# Patient Record
Sex: FEMALE | Race: WHITE | NOT HISPANIC OR LATINO | Employment: FULL TIME | URBAN - METROPOLITAN AREA
[De-identification: names, ages, dates, MRNs, and addresses within clinical notes are randomized per-mention and may not be internally consistent; named-entity substitution may affect disease eponyms.]

---

## 2014-04-28 LAB — HCV AB SER-ACNC: NEGATIVE

## 2020-08-19 ENCOUNTER — TELEPHONE (OUTPATIENT)
Dept: ADMINISTRATIVE | Facility: OTHER | Age: 60
End: 2020-08-19

## 2020-08-19 NOTE — TELEPHONE ENCOUNTER
----- Message from Joylene Rubinstein, [place] sent at 8/19/2020  9:42 AM EDT -----  08/19/20 9:43 AM    Hello, our patient Jose Obrien has had Mammogram completed/performed  Please assist in updating the patient chart by pulling the Care Everywhere (CE) document  The date of service is 07/2020       Thank you,  Joylene Rubinstein, MA   [facility]

## 2020-08-19 NOTE — TELEPHONE ENCOUNTER
Upon review of the In Basket request we were able to locate, review, and update the patient chart as requested for Mammogram     Any additional questions or concerns should be emailed to the Practice Liaisons via [email or web address] email, please do not reply via In Basket      Thank you  Cordell Forde MA

## 2020-08-20 ENCOUNTER — TELEPHONE (OUTPATIENT)
Dept: ADMINISTRATIVE | Facility: OTHER | Age: 60
End: 2020-08-20

## 2020-08-20 NOTE — TELEPHONE ENCOUNTER
Upon review of the In Basket request we were able to locate, review, and update the patient chart as requested for Hepatitis C   Any additional questions or concerns should be emailed to the Practice Liaisons via [email or web address] email, please do not reply via In Basket      Thank you  Harvey [place] MA

## 2020-08-20 NOTE — TELEPHONE ENCOUNTER
----- Message from Lena Durant sent at 8/20/2020  1:15 PM EDT -----  Regarding: HIV  08/20/20 1:16 PM    Hello, our patient Maria Teresa Bills has had HIV completed/performed  Please assist in updating the patient chart by pulling the Care Everywhere (CE) document  The date of service is 4/28/2014       Thank you,  John Kelley MA   [facility]

## 2020-08-21 ENCOUNTER — OFFICE VISIT (OUTPATIENT)
Dept: FAMILY MEDICINE CLINIC | Facility: CLINIC | Age: 60
End: 2020-08-21
Payer: COMMERCIAL

## 2020-08-21 VITALS
BODY MASS INDEX: 26.89 KG/M2 | OXYGEN SATURATION: 96 % | SYSTOLIC BLOOD PRESSURE: 102 MMHG | TEMPERATURE: 97.9 F | DIASTOLIC BLOOD PRESSURE: 70 MMHG | HEIGHT: 65 IN | WEIGHT: 161.4 LBS | HEART RATE: 74 BPM

## 2020-08-21 DIAGNOSIS — F32.5 MAJOR DEPRESSION IN COMPLETE REMISSION (HCC): ICD-10-CM

## 2020-08-21 DIAGNOSIS — G43.909 MIGRAINE WITHOUT STATUS MIGRAINOSUS, NOT INTRACTABLE, UNSPECIFIED MIGRAINE TYPE: ICD-10-CM

## 2020-08-21 DIAGNOSIS — F43.9 SITUATIONAL STRESS: Primary | ICD-10-CM

## 2020-08-21 DIAGNOSIS — B00.9 HERPES SIMPLEX: ICD-10-CM

## 2020-08-21 DIAGNOSIS — I10 ESSENTIAL HYPERTENSION: ICD-10-CM

## 2020-08-21 DIAGNOSIS — E78.2 MIXED HYPERLIPIDEMIA: ICD-10-CM

## 2020-08-21 PROCEDURE — 3008F BODY MASS INDEX DOCD: CPT | Performed by: FAMILY MEDICINE

## 2020-08-21 PROCEDURE — 3078F DIAST BP <80 MM HG: CPT | Performed by: FAMILY MEDICINE

## 2020-08-21 PROCEDURE — 1036F TOBACCO NON-USER: CPT | Performed by: FAMILY MEDICINE

## 2020-08-21 PROCEDURE — 3074F SYST BP LT 130 MM HG: CPT | Performed by: FAMILY MEDICINE

## 2020-08-21 PROCEDURE — 99204 OFFICE O/P NEW MOD 45 MIN: CPT | Performed by: FAMILY MEDICINE

## 2020-08-21 PROCEDURE — 3725F SCREEN DEPRESSION PERFORMED: CPT | Performed by: FAMILY MEDICINE

## 2020-08-21 RX ORDER — TRAZODONE HYDROCHLORIDE 50 MG/1
TABLET ORAL
Qty: 60 TABLET | Refills: 5 | Status: SHIPPED | OUTPATIENT
Start: 2020-08-21

## 2020-08-21 RX ORDER — BUPROPION HYDROCHLORIDE 150 MG/1
150 TABLET ORAL DAILY
COMMUNITY
Start: 2020-06-04 | End: 2021-07-28 | Stop reason: SDUPTHER

## 2020-08-21 RX ORDER — ELETRIPTAN HYDROBROMIDE 40 MG/1
40 TABLET, FILM COATED ORAL ONCE AS NEEDED
Qty: 6 TABLET | Refills: 0
Start: 2020-08-21 | End: 2021-07-28 | Stop reason: SDUPTHER

## 2020-08-21 RX ORDER — FLUOXETINE HYDROCHLORIDE 20 MG/1
20 CAPSULE ORAL DAILY
COMMUNITY
Start: 2020-07-05 | End: 2020-11-23 | Stop reason: SDUPTHER

## 2020-08-21 RX ORDER — VALACYCLOVIR HYDROCHLORIDE 1 G/1
1000 TABLET, FILM COATED ORAL 2 TIMES DAILY
Qty: 2 TABLET | Refills: 0
Start: 2020-08-21 | End: 2020-10-20 | Stop reason: SDUPTHER

## 2020-08-21 RX ORDER — ATORVASTATIN CALCIUM 10 MG/1
10 TABLET, FILM COATED ORAL DAILY
COMMUNITY
Start: 2020-06-04 | End: 2021-07-28 | Stop reason: SDUPTHER

## 2020-08-21 RX ORDER — ATENOLOL 50 MG/1
50 TABLET ORAL DAILY
COMMUNITY
Start: 2020-06-04 | End: 2021-07-28 | Stop reason: SDUPTHER

## 2020-08-21 RX ORDER — OMEPRAZOLE 40 MG/1
40 CAPSULE, DELAYED RELEASE ORAL DAILY
COMMUNITY
Start: 2020-06-04 | End: 2021-07-28 | Stop reason: SDUPTHER

## 2020-08-21 RX ORDER — TOPIRAMATE 50 MG/1
50 TABLET, FILM COATED ORAL DAILY
COMMUNITY
Start: 2020-06-04 | End: 2021-07-28 | Stop reason: SDUPTHER

## 2020-08-21 NOTE — PATIENT INSTRUCTIONS
Symptoms appear to be related to stress  They certainly are not related to exertion and she has a great deal of stress happening right now  Some discussion about her position and the Covid pandemic  Continue same medications for now with the exception of the addition of trazodone 50 mg, 1 or 2 tablets at bedtime to help with sleep  Suggest looking up relaxation techniques on her computer and practice them  Recheck in 3 weeks
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
## 2020-08-24 ENCOUNTER — TELEPHONE (OUTPATIENT)
Dept: ADMINISTRATIVE | Facility: OTHER | Age: 60
End: 2020-08-24

## 2020-08-24 NOTE — LETTER
Procedure Request Form: Colonoscopy      Date Requested: 20  Patient: Dulce Lester  Patient : 3//1960   Referring Provider: Daved Stalls, MD        Date of Procedure ______________________________       The above patient has informed us that they have completed their   most recent Colonoscopy at your facility  Please complete   this form and attach all corresponding procedure reports/results  Comments __________________________________________________________  ____________________________________________________________________  ____________________________________________________________________  ____________________________________________________________________    Facility Completing Procedure _________________________________________    Form Completed By (print name) _______________________________________      Signature __________________________________________________________      These reports are needed for  compliance    Please fax this completed form and a copy of the procedure report to our office located at [place] [address] as soon as possible to [phone] sebas Oneil: Phone [phone]    We thank you for your assistance in treating our mutual patient

## 2020-08-24 NOTE — TELEPHONE ENCOUNTER
----- Message from Neena Hinds MD sent at 8/21/2020  4:04 PM EDT -----  Please obtain colonoscopy results from [facility]

## 2020-08-24 NOTE — TELEPHONE ENCOUNTER
Upon review of the In Basket request and the patient's chart, initial outreach has been made via fax, please see Contacts section for details  A second outreach attempt will be made within 5 business days      Thank you  Min Dawson MA

## 2020-08-31 NOTE — TELEPHONE ENCOUNTER
Upon review of your request/inquiry, we have found as a result of outreach that patient did not have the requested item(s) completed  Any additional questions or concerns should be emailed to the Practice Liaisons via [email or web address] email, please do not reply via In Basket       Thank you  Ibeth Howard MA

## 2020-09-14 ENCOUNTER — OFFICE VISIT (OUTPATIENT)
Dept: FAMILY MEDICINE CLINIC | Facility: CLINIC | Age: 60
End: 2020-09-14
Payer: COMMERCIAL

## 2020-09-14 VITALS
TEMPERATURE: 98.2 F | HEIGHT: 65 IN | SYSTOLIC BLOOD PRESSURE: 100 MMHG | HEART RATE: 78 BPM | RESPIRATION RATE: 18 BRPM | WEIGHT: 163 LBS | BODY MASS INDEX: 27.16 KG/M2 | DIASTOLIC BLOOD PRESSURE: 68 MMHG

## 2020-09-14 DIAGNOSIS — L84 CORN OF FOOT: ICD-10-CM

## 2020-09-14 DIAGNOSIS — F43.9 SITUATIONAL STRESS: Primary | ICD-10-CM

## 2020-09-14 DIAGNOSIS — Z12.11 SCREENING FOR COLON CANCER: ICD-10-CM

## 2020-09-14 PROCEDURE — 99214 OFFICE O/P EST MOD 30 MIN: CPT | Performed by: FAMILY MEDICINE
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
## 2020-09-14 NOTE — PATIENT INSTRUCTIONS
Having difficulty dealing with the stress  Suggest taking a different attitude toward approaching what's going on at work  She is asked not to work for the next 2 weeks  Continue present medications  Also recommend that she see a documented everything that happens related to her job  As far as her right 4th toe, suggested Dr Jocelyn diaz  Finally, Riya for colon cancer screening  Recheck in 2 weeks

## 2020-09-14 NOTE — LETTER
September 14, 2020     Patient: Dulce Lester   YOB: 1960   Date of Visit: 9/14/2020       To Whom it May Concern: Gonzalo Acevedo is under my professional care  She was seen in my office on 9/14/2020  She is presently unable to work  Next evaluation in 2 weeks  If you have any questions or concerns, please don't hesitate to call           Sincerely,          Joseline Sweet MD        CC: No Recipients

## 2020-09-29 ENCOUNTER — LAB (OUTPATIENT)
Dept: LAB | Facility: MEDICAL CENTER | Age: 60
End: 2020-09-29
Payer: COMMERCIAL

## 2020-09-29 ENCOUNTER — OFFICE VISIT (OUTPATIENT)
Dept: FAMILY MEDICINE CLINIC | Facility: CLINIC | Age: 60
End: 2020-09-29
Payer: COMMERCIAL

## 2020-09-29 VITALS
OXYGEN SATURATION: 96 % | BODY MASS INDEX: 27.31 KG/M2 | WEIGHT: 163.9 LBS | DIASTOLIC BLOOD PRESSURE: 60 MMHG | SYSTOLIC BLOOD PRESSURE: 100 MMHG | HEIGHT: 65 IN | TEMPERATURE: 98.1 F | HEART RATE: 80 BPM

## 2020-09-29 DIAGNOSIS — Z11.4 SCREENING FOR HIV (HUMAN IMMUNODEFICIENCY VIRUS): ICD-10-CM

## 2020-09-29 DIAGNOSIS — E78.2 MIXED HYPERLIPIDEMIA: ICD-10-CM

## 2020-09-29 DIAGNOSIS — F43.9 SITUATIONAL STRESS: Primary | ICD-10-CM

## 2020-09-29 DIAGNOSIS — Z23 NEEDS FLU SHOT: ICD-10-CM

## 2020-09-29 DIAGNOSIS — I10 ESSENTIAL HYPERTENSION: ICD-10-CM

## 2020-09-29 DIAGNOSIS — Z23 NEED FOR INFLUENZA VACCINATION: ICD-10-CM

## 2020-09-29 DIAGNOSIS — F32.5 MAJOR DEPRESSION IN COMPLETE REMISSION (HCC): ICD-10-CM

## 2020-09-29 LAB
ANION GAP SERPL CALCULATED.3IONS-SCNC: 5 MMOL/L (ref 4–13)
BUN SERPL-MCNC: 24 MG/DL (ref 5–25)
CALCIUM SERPL-MCNC: 9.7 MG/DL (ref 8.3–10.1)
CHLORIDE SERPL-SCNC: 112 MMOL/L (ref 100–108)
CHOLEST SERPL-MCNC: 165 MG/DL (ref 50–200)
CO2 SERPL-SCNC: 25 MMOL/L (ref 21–32)
CREAT SERPL-MCNC: 0.93 MG/DL (ref 0.6–1.3)
GFR SERPL CREATININE-BSD FRML MDRD: 68 ML/MIN/1.73SQ M
GLUCOSE P FAST SERPL-MCNC: 93 MG/DL (ref 65–99)
HDLC SERPL-MCNC: 36 MG/DL
LDLC SERPL CALC-MCNC: 96 MG/DL (ref 0–100)
NONHDLC SERPL-MCNC: 129 MG/DL
POTASSIUM SERPL-SCNC: 4.6 MMOL/L (ref 3.5–5.3)
SODIUM SERPL-SCNC: 142 MMOL/L (ref 136–145)
TRIGL SERPL-MCNC: 165 MG/DL

## 2020-09-29 PROCEDURE — 90682 RIV4 VACC RECOMBINANT DNA IM: CPT

## 2020-09-29 PROCEDURE — 90471 IMMUNIZATION ADMIN: CPT

## 2020-09-29 PROCEDURE — 36415 COLL VENOUS BLD VENIPUNCTURE: CPT

## 2020-09-29 PROCEDURE — 80061 LIPID PANEL: CPT

## 2020-09-29 PROCEDURE — 87389 HIV-1 AG W/HIV-1&-2 AB AG IA: CPT

## 2020-09-29 PROCEDURE — 80048 BASIC METABOLIC PNL TOTAL CA: CPT

## 2020-09-29 PROCEDURE — 99214 OFFICE O/P EST MOD 30 MIN: CPT | Performed by: FAMILY MEDICINE

## 2020-09-29 NOTE — PROGRESS NOTES
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 that she is out and cannot communicate  Made her angrier  Continues on Prozac and Wellbutrin  Trazodone helps her sleep at night  Having difficulty the with the thought of continuing to work for people who lack more orals and want to do things dishonestly  Possibly has a different job opportunity that would be more for filling and less stressful  Past Medical History:   Diagnosis Date    Anxiety state 1/9/2015    Esophageal reflux 4/28/2014    Essential hypertension 2/26/2016    Herpes simplex 6/12/2012    [facility] in [facility]    Migraine 3/20/2015    Mixed hyperlipidemia 6/11/2010    Obstructive sleep apnea syndrome 2/26/2016    Palpitations 3/20/2015    Sensorineural hearing loss 10/30/2014    Trigeminal neuralgia of left side of face 8/25/2016    Vitamin D deficiency 4/29/2014        Past Surgical History:   Procedure Laterality Date    APPENDECTOMY      EXOSTECTOMY Right 2004    Fifth toe    LAPAROSCOPIC CHOLECYSTECTOMY  2006    LAPAROSCOPIC HYSTERECTOMY  2007    TONSILLECTOMY         Social History     Tobacco Use    Smoking status: Never Smoker    Smokeless tobacco: Never Used   Substance Use Topics    Alcohol use: Yes     Frequency: Monthly or less       Social History     Social History Narrative    Cat Magan since 8/13   since 10/7/17  He has 3 grown children  and 5 grandchildren  Rickieneelam Correia is a  for [employer] in [employer], a [employer]  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  First  committed suicide after they were   2 sons   1 grandson born 6/21/20      Works as  for [employer]  Previously at  [facility] since 5/28/15  After 8 years at [facility] Caballero owns [employer] in [facility]  The following portions of the patient's history were reviewed and updated as appropriate: allergies, current medications, past family history, past medical history, past social history, past surgical history and problem list       Review of Systems   Constitutional: Negative for activity change and appetite change  HENT: Negative for ear pain and hearing loss  Eyes: Negative for visual disturbance  Respiratory: Negative for shortness of breath and wheezing  Cardiovascular: Negative for chest pain and leg swelling  Gastrointestinal: Negative for abdominal pain, constipation and diarrhea  Genitourinary: Negative for difficulty urinating  Musculoskeletal: Negative for arthralgias and back pain  Skin: Negative for rash  Neurological: Negative for headaches  Psychiatric/Behavioral: Negative for dysphoric mood  The patient is nervous/anxious  /60 (BP Location: Left arm, Patient Position: Sitting, Cuff Size: Adult)   Pulse 80   Temp 98 1 °F (36 7 °C) (Tympanic)   Ht 5' 5" (1 651 m)   Wt 74 3 kg (163 lb 14 4 oz)   SpO2 96%   BMI 27 27 kg/m²      Physical Exam   Interacts appropriately  Good judgement  Tenderness over her left TMJ  BMI Counseling: Body mass index is 27 27 kg/m²  The BMI is above normal  Nutrition recommendations include encouraging healthy choices of fruits and vegetables, consuming healthier snacks and limiting drinks that contain sugar  Exercise recommendations include moderate physical activity 150 minutes/week                   Current Outpatient Medications:     atenolol (TENORMIN) 50 mg tablet, Take 50 mg by mouth daily, Disp: , Rfl:     atorvastatin (LIPITOR) 10 mg tablet, Take 10 mg by mouth daily, Disp: , Rfl:     buPROPion (WELLBUTRIN XL) 150 mg 24 hr tablet, Take 150 mg by mouth daily, Disp: , Rfl:    eletriptan (RELPAX) 40 MG tablet, Take 1 tablet (40 mg total) by mouth once as needed for migraine for up to 1 dose may repeat in 2 hours if necessary, Disp: 6 tablet, Rfl: 0    FLUoxetine (PROzac) 20 mg capsule, Take 20 mg by mouth daily, Disp: , Rfl:     omeprazole (PriLOSEC) 40 MG capsule, Take 40 mg by mouth daily, Disp: , Rfl:     topiramate (TOPAMAX) 50 MG tablet, Take 50 mg by mouth daily, Disp: , Rfl:     traZODone (DESYREL) 50 mg tablet, One-2 tablets at bedtime for sleep, Disp: 60 tablet, Rfl: 5    valACYclovir (VALTREX) 1,000 mg tablet, Take 1 tablet (1,000 mg total) by mouth 2 (two) times a day for 1 day, Disp: 2 tablet, Rfl: 0     No problem-specific Assessment & Plan notes found for this encounter  Diagnoses and all orders for this visit:    Situational stress    Major depression in complete remission ([facility] [address] )    Mixed hyperlipidemia  -     Lipid panel; Future    Essential hypertension  -     Basic metabolic panel; Future    Screening for HIV (human immunodeficiency virus)  -     HIV 1/2 Antigen/Antibody (4th Generation) w Reflex [facility]; Future        Patient Instructions   Continue present medications  Not yet able to return to work  Check HIV, lipid profile, and blood chemistry  Other medications remain the same  Suggest doing something positive every day  Walking included  May want to look for a different job  Recheck 3 weeks

## 2020-09-29 NOTE — LETTER
September 29, 2020     Patient: Susan Hobson   YOB: 1960   Date of Visit: 9/29/2020       To Whom it May Concern: Lamon Siemens is under my professional care  She was seen in my office on 9/29/2020  She  remains unable to return to work  Follow-up visit in 3 weeks  If you have any questions or concerns, please don't hesitate to call           Sincerely,          Mike Poe MD        CC: No Recipients

## 2020-09-29 NOTE — PATIENT INSTRUCTIONS
Continue present medications  Not yet able to return to work  Check HIV, lipid profile, and blood chemistry  Other medications remain the same  Suggest doing something positive every day  Walking included  May want to look for a different job  Recheck 3 weeks

## 2020-09-30 LAB — HIV 1+2 AB+HIV1 P24 AG SERPL QL IA: NORMAL

## 2020-10-01 ENCOUNTER — TELEPHONE (OUTPATIENT)
Dept: FAMILY MEDICINE CLINIC | Facility: CLINIC | Age: 60
End: 2020-10-01

## 2020-10-05 ENCOUNTER — OFFICE VISIT (OUTPATIENT)
Dept: FAMILY MEDICINE CLINIC | Facility: CLINIC | Age: 60
End: 2020-10-05
Payer: COMMERCIAL

## 2020-10-05 VITALS
RESPIRATION RATE: 20 BRPM | BODY MASS INDEX: 26.99 KG/M2 | DIASTOLIC BLOOD PRESSURE: 80 MMHG | OXYGEN SATURATION: 98 % | HEIGHT: 65 IN | WEIGHT: 162 LBS | SYSTOLIC BLOOD PRESSURE: 130 MMHG | HEART RATE: 81 BPM | TEMPERATURE: 96.9 F

## 2020-10-05 DIAGNOSIS — G50.0 TRIGEMINAL NEURALGIA OF LEFT SIDE OF FACE: Primary | ICD-10-CM

## 2020-10-05 PROCEDURE — 3075F SYST BP GE 130 - 139MM HG: CPT | Performed by: FAMILY MEDICINE

## 2020-10-05 PROCEDURE — 3079F DIAST BP 80-89 MM HG: CPT | Performed by: FAMILY MEDICINE

## 2020-10-05 PROCEDURE — 99214 OFFICE O/P EST MOD 30 MIN: CPT | Performed by: FAMILY MEDICINE

## 2020-10-05 RX ORDER — CARBAMAZEPINE 200 MG/1
200 TABLET ORAL 3 TIMES DAILY
Qty: 90 TABLET | Refills: 5 | Status: SHIPPED | OUTPATIENT
Start: 2020-10-05

## 2020-10-06 ENCOUNTER — TELEPHONE (OUTPATIENT)
Dept: ADMINISTRATIVE | Facility: OTHER | Age: 60
End: 2020-10-06

## 2020-10-20 ENCOUNTER — OFFICE VISIT (OUTPATIENT)
Dept: FAMILY MEDICINE CLINIC | Facility: CLINIC | Age: 60
End: 2020-10-20
Payer: COMMERCIAL

## 2020-10-20 VITALS
WEIGHT: 164.6 LBS | HEART RATE: 59 BPM | OXYGEN SATURATION: 97 % | SYSTOLIC BLOOD PRESSURE: 128 MMHG | DIASTOLIC BLOOD PRESSURE: 78 MMHG | TEMPERATURE: 97.6 F | BODY MASS INDEX: 27.42 KG/M2 | HEIGHT: 65 IN

## 2020-10-20 DIAGNOSIS — Z86.010 PERSONAL HISTORY OF COLONIC POLYPS: ICD-10-CM

## 2020-10-20 DIAGNOSIS — F32.5 MAJOR DEPRESSION IN COMPLETE REMISSION (HCC): ICD-10-CM

## 2020-10-20 DIAGNOSIS — Z00.00 HEALTH MAINTENANCE EXAMINATION: Primary | ICD-10-CM

## 2020-10-20 DIAGNOSIS — Z23 NEED FOR SHINGLES VACCINE: ICD-10-CM

## 2020-10-20 DIAGNOSIS — I10 ESSENTIAL HYPERTENSION: ICD-10-CM

## 2020-10-20 DIAGNOSIS — F43.9 SITUATIONAL STRESS: ICD-10-CM

## 2020-10-20 DIAGNOSIS — G50.0 TRIGEMINAL NEURALGIA OF LEFT SIDE OF FACE: ICD-10-CM

## 2020-10-20 DIAGNOSIS — B00.9 HERPES SIMPLEX: ICD-10-CM

## 2020-10-20 PROBLEM — Z86.0100 PERSONAL HISTORY OF COLONIC POLYPS: Status: ACTIVE | Noted: 2020-10-20

## 2020-10-20 PROCEDURE — 90750 HZV VACC RECOMBINANT IM: CPT

## 2020-10-20 PROCEDURE — 1036F TOBACCO NON-USER: CPT | Performed by: FAMILY MEDICINE

## 2020-10-20 PROCEDURE — 99396 PREV VISIT EST AGE 40-64: CPT | Performed by: FAMILY MEDICINE

## 2020-10-20 PROCEDURE — 90471 IMMUNIZATION ADMIN: CPT

## 2020-10-20 PROCEDURE — 3725F SCREEN DEPRESSION PERFORMED: CPT | Performed by: FAMILY MEDICINE

## 2020-10-20 RX ORDER — VALACYCLOVIR HYDROCHLORIDE 1 G/1
1000 TABLET, FILM COATED ORAL 2 TIMES DAILY
Qty: 28 TABLET | Refills: 3 | Status: SHIPPED | OUTPATIENT
Start: 2020-10-20 | End: 2021-07-28 | Stop reason: SDUPTHER

## 2020-11-03 ENCOUNTER — OFFICE VISIT (OUTPATIENT)
Dept: FAMILY MEDICINE CLINIC | Facility: CLINIC | Age: 60
End: 2020-11-03
Payer: COMMERCIAL

## 2020-11-03 VITALS
BODY MASS INDEX: 27.66 KG/M2 | HEIGHT: 65 IN | WEIGHT: 166 LBS | OXYGEN SATURATION: 98 % | HEART RATE: 67 BPM | SYSTOLIC BLOOD PRESSURE: 132 MMHG | TEMPERATURE: 97.3 F | DIASTOLIC BLOOD PRESSURE: 80 MMHG

## 2020-11-03 DIAGNOSIS — H93.11 TINNITUS OF RIGHT EAR: ICD-10-CM

## 2020-11-03 DIAGNOSIS — G43.909 MIGRAINE WITHOUT STATUS MIGRAINOSUS, NOT INTRACTABLE, UNSPECIFIED MIGRAINE TYPE: ICD-10-CM

## 2020-11-03 DIAGNOSIS — F43.9 SITUATIONAL STRESS: Primary | ICD-10-CM

## 2020-11-03 DIAGNOSIS — G50.0 TRIGEMINAL NEURALGIA OF LEFT SIDE OF FACE: ICD-10-CM

## 2020-11-03 PROCEDURE — 99214 OFFICE O/P EST MOD 30 MIN: CPT | Performed by: FAMILY MEDICINE

## 2020-11-03 PROCEDURE — 3075F SYST BP GE 130 - 139MM HG: CPT | Performed by: FAMILY MEDICINE

## 2020-11-03 PROCEDURE — 3079F DIAST BP 80-89 MM HG: CPT | Performed by: FAMILY MEDICINE

## 2020-11-03 PROCEDURE — 3008F BODY MASS INDEX DOCD: CPT | Performed by: FAMILY MEDICINE

## 2020-11-03 PROCEDURE — 1036F TOBACCO NON-USER: CPT | Performed by: FAMILY MEDICINE

## 2020-11-23 DIAGNOSIS — F32.5 MAJOR DEPRESSION IN COMPLETE REMISSION (HCC): ICD-10-CM

## 2020-11-23 DIAGNOSIS — F41.1 ANXIETY STATE: Primary | ICD-10-CM

## 2020-11-23 RX ORDER — FLUOXETINE HYDROCHLORIDE 20 MG/1
20 CAPSULE ORAL DAILY
Qty: 90 CAPSULE | Refills: 3 | Status: SHIPPED | OUTPATIENT
Start: 2020-11-23 | End: 2021-07-28 | Stop reason: SDUPTHER

## 2021-06-11 ENCOUNTER — TELEPHONE (OUTPATIENT)
Dept: FAMILY MEDICINE CLINIC | Facility: CLINIC | Age: 61
End: 2021-06-11

## 2021-07-29 ENCOUNTER — RA CDI HCC (OUTPATIENT)
Dept: OTHER | Facility: HOSPITAL | Age: 61
End: 2021-07-29

## 2021-07-29 NOTE — PROGRESS NOTES
[facility]  coding opportunities          Chart reviewed, no opportunity found: CHART REVIEWED, NO OPPORTUNITY FOUND                     Patients insurance company: [facility] (Medicare Advantage and Commercial)

## 2021-08-06 ENCOUNTER — TELEPHONE (OUTPATIENT)
Dept: OTHER | Facility: OTHER | Age: 61
End: 2021-08-06

## 2021-09-20 ENCOUNTER — TELEPHONE (OUTPATIENT)
Dept: FAMILY MEDICINE CLINIC | Facility: CLINIC | Age: 61
End: 2021-09-20

## 2021-09-20 NOTE — TELEPHONE ENCOUNTER
Areli Sumner is asking if you could give her a call to discuss issues regarding her son  She is reaching out for your advice and help  She was offered a visit but declined and said she would like to start with a telephone call

## 2021-09-21 NOTE — TELEPHONE ENCOUNTER
Spoke to Nancie machado about her son Alexander Abebe who is struggling with alcohol  Suggest that she try to get him into the detox/rehab unit at [facility]

## 2022-02-18 ENCOUNTER — OFFICE VISIT (OUTPATIENT)
Dept: FAMILY MEDICINE CLINIC | Facility: CLINIC | Age: 62
End: 2022-02-18
Payer: COMMERCIAL

## 2022-02-18 VITALS
HEIGHT: 65 IN | WEIGHT: 164 LBS | BODY MASS INDEX: 27.32 KG/M2 | SYSTOLIC BLOOD PRESSURE: 133 MMHG | TEMPERATURE: 97 F | OXYGEN SATURATION: 94 % | HEART RATE: 63 BPM | DIASTOLIC BLOOD PRESSURE: 70 MMHG

## 2022-02-18 DIAGNOSIS — Z12.31 ENCOUNTER FOR SCREENING MAMMOGRAM FOR MALIGNANT NEOPLASM OF BREAST: ICD-10-CM

## 2022-02-18 DIAGNOSIS — L30.4 PRURITIC INTERTRIGO: Primary | ICD-10-CM

## 2022-02-18 PROCEDURE — 3725F SCREEN DEPRESSION PERFORMED: CPT | Performed by: FAMILY MEDICINE

## 2022-02-18 PROCEDURE — 3008F BODY MASS INDEX DOCD: CPT | Performed by: FAMILY MEDICINE

## 2022-02-18 PROCEDURE — 99213 OFFICE O/P EST LOW 20 MIN: CPT | Performed by: FAMILY MEDICINE

## 2022-02-18 PROCEDURE — 1036F TOBACCO NON-USER: CPT | Performed by: FAMILY MEDICINE

## 2022-02-18 RX ORDER — CLOTRIMAZOLE AND BETAMETHASONE DIPROPIONATE 10; .64 MG/G; MG/G
CREAM TOPICAL 2 TIMES DAILY
Qty: 45 G | Refills: 2 | Status: SHIPPED | OUTPATIENT
Start: 2022-02-18

## 2022-02-18 NOTE — PROGRESS NOTES
Chief Complaint   Patient presents with    Rash     itchy rash in groin area for a couple of months        HPI   Here with an itchy rash in the groin area for a couple of months  Has tried everything on it  Very itchy  Purple  Going to [place] in another week  Past Medical History:   Diagnosis Date    Anxiety state 1/9/2015    Esophageal reflux 4/28/2014    Essential hypertension 2/26/2016    Herpes simplex 6/12/2012    Major depression in complete remission ([facility] [address] ) 02/21/2014    Migraine 3/20/2015    Mixed hyperlipidemia 6/11/2010    Obstructive sleep apnea syndrome 2/26/2016    Palpitations 3/20/2015    Personal history of colonic polyps 10/20/2020    Sensorineural hearing loss 10/30/2014    Trigeminal neuralgia of left side of face 08/25/2016    Vitamin D deficiency 4/29/2014        Past Surgical History:   Procedure Laterality Date    APPENDECTOMY      EXOSTECTOMY Right 2004    Fifth toe    LAPAROSCOPIC CHOLECYSTECTOMY  2006    LAPAROSCOPIC HYSTERECTOMY  2007    TONSILLECTOMY         Social History     Tobacco Use    Smoking status: Never Smoker    Smokeless tobacco: Never Used   Substance Use Topics    Alcohol use: Yes       Social History     Social History Narrative    Danay Ernst since 8/13   since 10/7/17  He has 3 grown children  and 5 grandchildren  Farheen Castro is a  for [employer] in [place], a [employer]  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  Will retire in [facility]  3years older  Has some lung issues  First  committed suicide after they were   2 sons   1 grandson born 6/21/20  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 [place] [employer] [facility]            The following portions of the patient's history were reviewed and updated as appropriate: allergies, current medications, past family history, past medical history, past social history, past surgical history and problem list       Review of Systems       /70 (BP Location: Left arm, Patient Position: Sitting, Cuff Size: Standard)   Pulse 63   Temp (!) 97 °F (36 1 °C) (Temporal)   Ht 5' 5" (1 651 m)   Wt 74 4 kg (164 lb)   SpO2 94%   BMI 27 29 kg/m²      Physical Exam                       Current Outpatient Medications:     atenolol (TENORMIN) 50 mg tablet, Take 1 tablet (50 mg total) by mouth daily, Disp: 90 tablet, Rfl: 3    atorvastatin (LIPITOR) 10 mg tablet, Take 1 tablet (10 mg total) by mouth daily, Disp: 90 tablet, Rfl: 3    buPROPion (WELLBUTRIN XL) 150 mg 24 hr tablet, Take 1 tablet (150 mg total) by mouth daily, Disp: 90 tablet, Rfl: 3    eletriptan (RELPAX) 40 MG tablet, Take 1 tablet (40 mg total) by mouth once as needed for migraine for up to 1 dose may repeat in 2 hours if necessary, Disp: 18 tablet, Rfl: 3    FLUoxetine (PROzac) 20 mg capsule, Take 1 capsule (20 mg total) by mouth daily, Disp: 90 capsule, Rfl: 3    omeprazole (PriLOSEC) 40 MG capsule, Take 1 capsule (40 mg total) by mouth daily, Disp: 90 capsule, Rfl: 3    topiramate (TOPAMAX) 50 MG tablet, Take 1 tablet (50 mg total) by mouth daily, Disp: 90 tablet, Rfl: 3    traZODone (DESYREL) 50 mg tablet, One-2 tablets at bedtime for sleep, Disp: 60 tablet, Rfl: 5    valACYclovir (VALTREX) 1,000 mg tablet, Take 1 tablet (1,000 mg total) by mouth 2 (two) times a day for 1 day, Disp: 28 tablet, Rfl: 3    carBAMazepine (TEGretol) 200 mg tablet, Take 1 tablet (200 mg total) by mouth 3 (three) times a day (Patient not taking: Reported on 2/18/2022 ), Disp: 90 tablet, Rfl: 5    clotrimazole-betamethasone (LOTRISONE) 1-0 05 % cream, Apply topically 2 (two) times a day, Disp: 45 g, Rfl: 2     No problem-specific Assessment & Plan notes found for this encounter         Diagnoses and all orders for this visit:    Pruritic intertrigo  -     clotrimazole-betamethasone (LOTRISONE) 1-0 05 % cream; Apply topically 2 (two) times a day    Encounter for screening mammogram for malignant neoplasm of breast  -     Mammo screening bilateral w 3d & cad; Future        Patient Instructions   Trial of Lotrisone cream twice a day to affected area  Should resolve her itching but if the rash does not go away after a couple weeks, would get Lotrimin over-the-counter  Lotrimin is an antifungal and does not have a steroid in it  Prescription given for mammogram   Happy 62th birthday in [place]!

## 2022-02-18 NOTE — PATIENT INSTRUCTIONS
Trial of Lotrisone cream twice a day to affected area  Should resolve her itching but if the rash does not go away after a couple weeks, would get Lotrimin over-the-counter  Lotrimin is an antifungal and does not have a steroid in it  Prescription given for mammogram   Happy 62th birthday in [place]!

## 2022-04-26 ENCOUNTER — TELEPHONE (OUTPATIENT)
Dept: FAMILY MEDICINE CLINIC | Facility: CLINIC | Age: 62
End: 2022-04-26

## 2022-04-26 DIAGNOSIS — T75.3XXA SEA SICKNESS, INITIAL ENCOUNTER: Primary | ICD-10-CM

## 2022-04-26 RX ORDER — SCOLOPAMINE TRANSDERMAL SYSTEM 1 MG/1
1 PATCH, EXTENDED RELEASE TRANSDERMAL
Qty: 3 PATCH | Refills: 1 | Status: SHIPPED | OUTPATIENT
Start: 2022-04-26

## 2022-04-26 NOTE — TELEPHONE ENCOUNTER
Patient called in asking for motion sickness patches for a cruise that she will be on May 1  Can you send this to her [facility] pharmacy?

## 2022-06-07 ENCOUNTER — OFFICE VISIT (OUTPATIENT)
Dept: FAMILY MEDICINE CLINIC | Facility: CLINIC | Age: 62
End: 2022-06-07
Payer: COMMERCIAL

## 2022-06-07 VITALS
SYSTOLIC BLOOD PRESSURE: 100 MMHG | HEIGHT: 65 IN | OXYGEN SATURATION: 94 % | TEMPERATURE: 96.9 F | BODY MASS INDEX: 27.82 KG/M2 | WEIGHT: 167 LBS | HEART RATE: 56 BPM | DIASTOLIC BLOOD PRESSURE: 70 MMHG

## 2022-06-07 DIAGNOSIS — N30.90 CYSTITIS: ICD-10-CM

## 2022-06-07 DIAGNOSIS — R35.0 MICTURITION FREQUENCY: Primary | ICD-10-CM

## 2022-06-07 LAB
SL AMB  POCT GLUCOSE, UA: ABNORMAL
SL AMB LEUKOCYTE ESTERASE,UA: ABNORMAL
SL AMB POCT BILIRUBIN,UA: ABNORMAL
SL AMB POCT BLOOD,UA: ABNORMAL
SL AMB POCT CLARITY,UA: YELLOW
SL AMB POCT COLOR,UA: CLEAR
SL AMB POCT KETONES,UA: ABNORMAL
SL AMB POCT NITRITE,UA: ABNORMAL
SL AMB POCT PH,UA: ABNORMAL
SL AMB POCT SPECIFIC GRAVITY,UA: 1010
SL AMB POCT URINE PROTEIN: ABNORMAL
SL AMB POCT UROBILINOGEN: ABNORMAL

## 2022-06-07 PROCEDURE — 87186 SC STD MICRODIL/AGAR DIL: CPT | Performed by: FAMILY MEDICINE

## 2022-06-07 PROCEDURE — 99214 OFFICE O/P EST MOD 30 MIN: CPT | Performed by: FAMILY MEDICINE

## 2022-06-07 PROCEDURE — 1036F TOBACCO NON-USER: CPT | Performed by: FAMILY MEDICINE

## 2022-06-07 PROCEDURE — 81002 URINALYSIS NONAUTO W/O SCOPE: CPT | Performed by: FAMILY MEDICINE

## 2022-06-07 PROCEDURE — 3008F BODY MASS INDEX DOCD: CPT | Performed by: FAMILY MEDICINE

## 2022-06-07 PROCEDURE — 87077 CULTURE AEROBIC IDENTIFY: CPT | Performed by: FAMILY MEDICINE

## 2022-06-07 PROCEDURE — 87086 URINE CULTURE/COLONY COUNT: CPT | Performed by: FAMILY MEDICINE

## 2022-06-07 RX ORDER — SULFAMETHOXAZOLE AND TRIMETHOPRIM 800; 160 MG/1; MG/1
1 TABLET ORAL 2 TIMES DAILY
Qty: 6 TABLET | Refills: 0 | Status: SHIPPED | OUTPATIENT
Start: 2022-06-07 | End: 2022-06-10

## 2022-06-09 LAB
BACTERIA UR CULT: ABNORMAL
BACTERIA UR CULT: ABNORMAL

## 2022-06-14 NOTE — PROGRESS NOTES
Subjective:    HPI  Chris Dumont is a 63 y o  female here today for:  Chief Complaint   Patient presents with    Urinary Tract Infection     ---Above per clinical staff & reviewed  ---  HPI:   64yof with several days of increased frequency of urination and not urinating enough when she tries   Mild burning  No flank pain,  No nausea, vomiting, fevers  No other associated symptoms    Results for orders placed or performed in visit on 06/07/22   Urine culture    Specimen: Urine, Other   Result Value Ref Range    Urine Culture 20,000-29,000 cfu/ml Klebsiella pneumoniae (A)     Urine Culture 10,000-19,000 cfu/ml Enterococcus faecalis (A)        Susceptibility    Enterococcus faecalis - CAMILA     ZID Performed Yes       Ampicillin ($$) <=2 00 Susceptible ug/ml     Levofloxacin ($) 1 00 Susceptible ug/ml     Nitrofurantoin <=32 Susceptible ug/ml     Tetracycline <=2 Susceptible ug/ml     Vancomycin ($) 2 00 Susceptible ug/ml    Klebsiella pneumoniae - CAMILA     ZID Performed Yes       Amoxicillin + Clavulanate <=8/4 Susceptible ug/ml     Ampicillin ($$) >16 00 Resistant ug/ml     Ampicillin + Sulbactam ($) 8/4 Susceptible ug/ml     Aztreonam ($$$)  <=4 Susceptible ug/ml     Cefazolin ($) <=2 00 Susceptible ug/ml     Ciprofloxacin ($)  <=0 25 Susceptible ug/ml     Gentamicin ($$) <=2 Susceptible ug/ml     Levofloxacin ($) <=0 50 Susceptible ug/ml     Nitrofurantoin 64 Intermediate ug/ml     Tetracycline <=4 Susceptible ug/ml     Tobramycin ($) <=2 Susceptible ug/ml     Trimethoprim + Sulfamethoxazole ($$$) <=0 5/9 5 Susceptible ug/ml   POCT urine dip   Result Value Ref Range    LEUKOCYTE ESTERASE,UA large     NITRITE,UA neg     SL AMB POCT UROBILINOGEN neg     POCT URINE PROTEIN pos      PH,UA neg     BLOOD,UA neg     SPECIFIC GRAVITY,UA 1,010     KETONES,UA neg     BILIRUBIN,UA neg     GLUCOSE, UA neg      COLOR,UA clear     CLARITY,UA yellow          The following portions of the patient's history were reviewed and updated as appropriate: allergies, current medications, past family history, past medical history, past social history, past surgical history and problem list     Past Medical History:   Diagnosis Date    Anxiety state 1/9/2015    Esophageal reflux 4/28/2014    Essential hypertension 2/26/2016    Herpes simplex 6/12/2012    Major depression in complete remission ([facility] [address] ) 02/21/2014    Migraine 3/20/2015    Mixed hyperlipidemia 6/11/2010    Obstructive sleep apnea syndrome 2/26/2016    Palpitations 3/20/2015    Personal history of colonic polyps 10/20/2020    Sensorineural hearing loss 10/30/2014    Trigeminal neuralgia of left side of face 08/25/2016    Vitamin D deficiency 4/29/2014       Past Surgical History:   Procedure Laterality Date    APPENDECTOMY      EXOSTECTOMY Right 2004    Fifth toe    LAPAROSCOPIC CHOLECYSTECTOMY  2006    LAPAROSCOPIC HYSTERECTOMY  2007    TONSILLECTOMY         Social History     Socioeconomic History    Marital status: /Civil Union     Spouse name: None    Number of children: None    Years of education: None    Highest education level: None   Occupational History    None   Tobacco Use    Smoking status: Never Smoker    Smokeless tobacco: Never Used   Vaping Use    Vaping Use: Never used   Substance and Sexual Activity    Alcohol use: Yes    Drug use: Never    Sexual activity: Yes   Other Topics Concern    None   Social History Narrative    Niki Weirter since 8/13   since 10/7/17  He has 3 grown children  and 5 grandchildren  Monikfatou  is a  for [employer] in [place], a [employer]  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  Will retire in [employer]  3years older  Has some lung issues  First  committed suicide after they were   2 sons   1 grandson born 6/21/20  Works for [employer] as a   [employer] services  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     Financial Resource Strain: Not on file   Food Insecurity: Not on file   Transportation Needs: Not on file   Physical Activity: Not on file   Stress: Not on file   Social Connections: Not on file   Intimate Partner Violence: Not on file   Housing Stability: Not on file       Current Outpatient Medications   Medication Sig Dispense Refill    atenolol (TENORMIN) 50 mg tablet Take 1 tablet (50 mg total) by mouth daily 90 tablet 3    atorvastatin (LIPITOR) 10 mg tablet Take 1 tablet (10 mg total) by mouth daily 90 tablet 3    buPROPion (WELLBUTRIN XL) 150 mg 24 hr tablet Take 1 tablet (150 mg total) by mouth daily 90 tablet 3    eletriptan (RELPAX) 40 MG tablet Take 1 tablet (40 mg total) by mouth once as needed for migraine for up to 1 dose may repeat in 2 hours if necessary 18 tablet 3    FLUoxetine (PROzac) 20 mg capsule Take 1 capsule (20 mg total) by mouth daily 90 capsule 3    omeprazole (PriLOSEC) 40 MG capsule Take 1 capsule (40 mg total) by mouth daily 90 capsule 3    topiramate (TOPAMAX) 50 MG tablet Take 1 tablet (50 mg total) by mouth daily 90 tablet 3    carBAMazepine (TEGretol) 200 mg tablet Take 1 tablet (200 mg total) by mouth 3 (three) times a day 90 tablet 5    clotrimazole-betamethasone (LOTRISONE) 1-0 05 % cream Apply topically 2 (two) times a day (Patient not taking: No sig reported) 45 g 2    scopolamine (TRANSDERM-SCOP) 1 mg/3 days TD 72 hr patch Place 1 patch on the skin every third day 3 patch 1    traZODone (DESYREL) 50 mg tablet One-2 tablets at bedtime for sleep (Patient not taking: Reported on 6/7/2022) 60 tablet 5    valACYclovir (VALTREX) 1,000 mg tablet Take 1 tablet (1,000 mg total) by mouth 2 (two) times a day for 1 day 28 tablet 3     No current facility-administered medications for this visit  Review of Systems   Constitutional: Negative  Negative for chills and fever  HENT: Negative  Negative for ear pain and sore throat      Eyes: Negative for pain and visual disturbance  Respiratory: Negative  Negative for cough and shortness of breath  Cardiovascular: Negative  Negative for chest pain and palpitations  Gastrointestinal: Negative  Negative for abdominal pain and vomiting  Genitourinary: Positive for decreased urine volume, dysuria, frequency and urgency  Negative for hematuria  Musculoskeletal: Negative for arthralgias and back pain  Skin: Negative for color change and rash  Neurological: Negative  Negative for seizures and syncope  Psychiatric/Behavioral: Negative  Objective:    /70 (BP Location: Left arm, Patient Position: Sitting, Cuff Size: Large)   Pulse 56   Temp (!) 96 9 °F (36 1 °C) (Temporal)   Ht 5' 5" (1 651 m)   Wt 75 8 kg (167 lb)   SpO2 94%   BMI 27 79 kg/m²   Wt Readings from Last 3 Encounters:   06/07/22 75 8 kg (167 lb)   02/18/22 74 4 kg (164 lb)   11/03/20 75 3 kg (166 lb)     BP Readings from Last 3 Encounters:   06/07/22 100/70   02/18/22 133/70   11/03/20 132/80       Lab Results   Component Value Date    TRIG 165 (H) 09/29/2020    HDL 36 (L) 09/29/2020    K 4 6 09/29/2020     (H) 09/29/2020    CREATININE 0 93 09/29/2020    BUN 24 09/29/2020    CO2 25 09/29/2020    GLUF 93 09/29/2020    HGBA1C 5 7 (H) 11/09/2018       Physical Exam  Vitals and nursing note reviewed  Constitutional:       Appearance: Normal appearance  She is well-developed  HENT:      Head: Normocephalic and atraumatic  Eyes:      Pupils: Pupils are equal, round, and reactive to light  Cardiovascular:      Rate and Rhythm: Normal rate and regular rhythm  Heart sounds: No murmur heard  Pulmonary:      Effort: Pulmonary effort is normal       Breath sounds: Normal breath sounds  Abdominal:      Tenderness: There is no right CVA tenderness or left CVA tenderness  Musculoskeletal:      Cervical back: Normal range of motion and neck supple  Skin:     General: Skin is warm        Capillary Refill: Capillary refill takes less than 2 seconds  Neurological:      Mental Status: She is alert and oriented to person, place, and time  Cranial Nerves: No cranial nerve deficit  Psychiatric:         Mood and Affect: Mood normal          Thought Content: Thought content normal                        Assessment/Plan:   John Mendes was seen today for urinary tract infection  Diagnoses and all orders for this visit:    Micturition frequency  -     POCT urine dip  -     sulfamethoxazole-trimethoprim (BACTRIM DS) 800-160 mg per tablet; Take 1 tablet by mouth 2 (two) times a day for 3 days  -     Urine culture; Future  -     Urine culture    Cystitis  -     sulfamethoxazole-trimethoprim (BACTRIM DS) 800-160 mg per tablet; Take 1 tablet by mouth 2 (two) times a day for 3 days      Return if symptoms worsen or fail to improve  There are no Patient Instructions on file for this visit

## 2022-07-19 ENCOUNTER — TELEPHONE (OUTPATIENT)
Dept: NEUROSURGERY | Facility: CLINIC | Age: 62
End: 2022-07-19

## 2022-07-19 NOTE — TELEPHONE ENCOUNTER
I called this patient at [phone] and left msg x1 for call back to get more information about reason for appointment

## 2022-07-21 NOTE — TELEPHONE ENCOUNTER
7/21/22 PT CALLED AND LMOM AGAIN TO MAKE APPT WITH DR LOJA  CALLED AND SPOKE TO PT, SHE STATES THE APPT IS FOR HER   NOTE PLACED IN HIS CHART FOR A CALL BACK REGARDING APPT AND MRI

## 2022-08-11 DIAGNOSIS — E78.2 MIXED HYPERLIPIDEMIA: ICD-10-CM

## 2022-08-11 DIAGNOSIS — K21.9 GASTROESOPHAGEAL REFLUX DISEASE WITHOUT ESOPHAGITIS: ICD-10-CM

## 2022-08-11 DIAGNOSIS — F32.5 MAJOR DEPRESSION IN COMPLETE REMISSION (HCC): ICD-10-CM

## 2022-08-11 DIAGNOSIS — F41.1 ANXIETY STATE: ICD-10-CM

## 2022-08-11 DIAGNOSIS — G43.909 MIGRAINE WITHOUT STATUS MIGRAINOSUS, NOT INTRACTABLE, UNSPECIFIED MIGRAINE TYPE: ICD-10-CM

## 2022-08-11 DIAGNOSIS — I10 ESSENTIAL HYPERTENSION: ICD-10-CM

## 2022-08-11 RX ORDER — BUPROPION HYDROCHLORIDE 150 MG/1
TABLET ORAL
Qty: 90 TABLET | Refills: 3 | Status: SHIPPED | OUTPATIENT
Start: 2022-08-11

## 2022-08-11 RX ORDER — ATORVASTATIN CALCIUM 10 MG/1
TABLET, FILM COATED ORAL
Qty: 90 TABLET | Refills: 3 | Status: SHIPPED | OUTPATIENT
Start: 2022-08-11

## 2022-08-11 RX ORDER — FLUOXETINE HYDROCHLORIDE 20 MG/1
CAPSULE ORAL
Qty: 90 CAPSULE | Refills: 3 | Status: SHIPPED | OUTPATIENT
Start: 2022-08-11

## 2022-08-11 RX ORDER — OMEPRAZOLE 40 MG/1
CAPSULE, DELAYED RELEASE ORAL
Qty: 90 CAPSULE | Refills: 3 | Status: SHIPPED | OUTPATIENT
Start: 2022-08-11

## 2022-08-11 RX ORDER — TOPIRAMATE 50 MG/1
TABLET, FILM COATED ORAL
Qty: 90 TABLET | Refills: 3 | Status: SHIPPED | OUTPATIENT
Start: 2022-08-11

## 2022-08-11 RX ORDER — ATENOLOL 50 MG/1
TABLET ORAL
Qty: 90 TABLET | Refills: 3 | Status: SHIPPED | OUTPATIENT
Start: 2022-08-11

## 2022-12-12 ENCOUNTER — OFFICE VISIT (OUTPATIENT)
Dept: FAMILY MEDICINE CLINIC | Facility: CLINIC | Age: 62
End: 2022-12-12

## 2022-12-12 VITALS
HEIGHT: 65 IN | SYSTOLIC BLOOD PRESSURE: 118 MMHG | OXYGEN SATURATION: 95 % | BODY MASS INDEX: 27.66 KG/M2 | WEIGHT: 166 LBS | TEMPERATURE: 97.6 F | DIASTOLIC BLOOD PRESSURE: 72 MMHG | HEART RATE: 63 BPM

## 2022-12-12 DIAGNOSIS — Z00.00 HEALTH MAINTENANCE EXAMINATION: ICD-10-CM

## 2022-12-12 DIAGNOSIS — K21.9 GASTROESOPHAGEAL REFLUX DISEASE WITHOUT ESOPHAGITIS: ICD-10-CM

## 2022-12-12 DIAGNOSIS — I10 ESSENTIAL HYPERTENSION: ICD-10-CM

## 2022-12-12 DIAGNOSIS — R73.03 PREDIABETES: ICD-10-CM

## 2022-12-12 DIAGNOSIS — J45.21 MILD INTERMITTENT ASTHMA WITH EXACERBATION: Primary | ICD-10-CM

## 2022-12-12 DIAGNOSIS — E78.2 MIXED HYPERLIPIDEMIA: ICD-10-CM

## 2022-12-12 DIAGNOSIS — Z23 NEEDS FLU SHOT: ICD-10-CM

## 2022-12-12 DIAGNOSIS — R00.2 PALPITATIONS: ICD-10-CM

## 2022-12-12 DIAGNOSIS — Z12.31 ENCOUNTER FOR SCREENING MAMMOGRAM FOR BREAST CANCER: ICD-10-CM

## 2022-12-12 DIAGNOSIS — F33.42 RECURRENT MAJOR DEPRESSIVE DISORDER, IN FULL REMISSION (HCC): ICD-10-CM

## 2022-12-12 RX ORDER — GUAIFENESIN AND CODEINE PHOSPHATE 100; 10 MG/5ML; MG/5ML
SOLUTION ORAL
Qty: 240 ML | Refills: 0 | Status: SHIPPED | OUTPATIENT
Start: 2022-12-12

## 2022-12-12 RX ORDER — PREDNISONE 10 MG/1
TABLET ORAL
Qty: 30 TABLET | Refills: 0 | Status: SHIPPED | OUTPATIENT
Start: 2022-12-12

## 2022-12-12 RX ORDER — ALBUTEROL SULFATE 90 UG/1
2 AEROSOL, METERED RESPIRATORY (INHALATION) EVERY 4 HOURS PRN
Qty: 18 G | Refills: 5 | Status: SHIPPED | OUTPATIENT
Start: 2022-12-12

## 2022-12-12 RX ORDER — ATENOLOL 50 MG/1
75 TABLET ORAL DAILY
Qty: 90 TABLET | Refills: 3
Start: 2022-12-12

## 2022-12-12 NOTE — PROGRESS NOTES
Chief Complaint   Patient presents with   • Nasal Congestion   • Cough     Been going on for about 2 weeks        HPI   Here with bronchitis  Symptoms have been present for about 2 weeks  No fever  Bothered by stuffy nose  Postnasal drip  Using Mucinex  And pseudoephedrine  Having some pain across the left side of her face and into her left ear  Cough keeping her up at night  Gets frequent flutters in her heart  Past Medical History:   Diagnosis Date   • Anxiety state 1/9/2015   • Esophageal reflux 4/28/2014   • Essential hypertension 2/26/2016   • Herpes simplex 6/12/2012   • Major depression in complete remission ([facility] [address] ) 02/21/2014   • Migraine 3/20/2015   • Mixed hyperlipidemia 6/11/2010   • Obstructive sleep apnea syndrome 2/26/2016   • Palpitations 3/20/2015   • Personal history of colonic polyps 10/20/2020   • Sensorineural hearing loss 10/30/2014   • Trigeminal neuralgia of left side of face 08/25/2016   • Vitamin D deficiency 4/29/2014        Past Surgical History:   Procedure Laterality Date   • APPENDECTOMY     • EXOSTECTOMY Right 2004    Fifth toe   • LAPAROSCOPIC CHOLECYSTECTOMY  2006   • LAPAROSCOPIC HYSTERECTOMY  2007   • TONSILLECTOMY         Social History     Tobacco Use   • Smoking status: Never   • Smokeless tobacco: Never   Substance Use Topics   • Alcohol use: Yes       Social History     Social History Narrative    Cinthia Posada since 8/13   since 10/7/17  He has 3 grown children  and 5 grandchildren  Kirstin Lizbeth is a  for [employer] in [place], a [employer]  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  Will retire in [facility]  3years older  Has some lung issues  First  committed suicide after they were   2 sons   1 grandson born 6/21/20  Works for [employer] as a   [employer]  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 [place]  The following portions of the patient's history were reviewed and updated as appropriate: allergies, current medications, past family history, past medical history, past social history, past surgical history and problem list       Review of Systems       /72 (BP Location: Left arm, Patient Position: Sitting, Cuff Size: Standard)   Pulse 63   Temp 97 6 °F (36 4 °C) (Temporal)   Ht 5' 5" (1 651 m)   Wt 75 3 kg (166 lb)   SpO2 95%   BMI 27 62 kg/m²      Physical Exam   Appears comfortable  Both eardrums are white  Throat reveals no erythema  Minimal tenderness over the left maxillary sinus  Other sinuses are pain-free  Deep cough noted  Lungs reveal expiratory wheezing with forced expiration                Current Outpatient Medications:   •  albuterol (PROVENTIL HFA,VENTOLIN HFA) 90 mcg/act inhaler, Inhale 2 puffs every 4 (four) hours as needed for wheezing or shortness of breath, Disp: 18 g, Rfl: 5  •  atenolol (TENORMIN) 50 mg tablet, Take 1 5 tablets (75 mg total) by mouth daily, Disp: 90 tablet, Rfl: 3  •  atorvastatin (LIPITOR) 10 mg tablet, TAKE 1 TABLET DAILY, Disp: 90 tablet, Rfl: 3  •  buPROPion (WELLBUTRIN XL) 150 mg 24 hr tablet, TAKE 1 TABLET DAILY, Disp: 90 tablet, Rfl: 3  •  eletriptan (RELPAX) 40 MG tablet, Take 1 tablet (40 mg total) by mouth once as needed for migraine for up to 1 dose may repeat in 2 hours if necessary, Disp: 18 tablet, Rfl: 3  •  FLUoxetine (PROzac) 20 mg capsule, TAKE 1 CAPSULE DAILY, Disp: 90 capsule, Rfl: 3  •  guaifenesin-codeine (GUAIFENESIN AC) 100-10 MG/5ML liquid, 1-2 tsp every 4 hours as needed for cough, Disp: 240 mL, Rfl: 0  •  omeprazole (PriLOSEC) 40 MG capsule, TAKE 1 CAPSULE DAILY, Disp: 90 capsule, Rfl: 3  •  predniSONE 10 mg tablet, Take 5 tablets for 2 days, 4 tablets for 2 days, 3 for 2 days, 2 for 2 days, 1 for 2 days, Disp: 30 tablet, Rfl: 0  •  topiramate (TOPAMAX) 50 MG tablet, TAKE 1 TABLET DAILY, Disp: 90 tablet, Rfl: 3  •  valACYclovir (VALTREX) 1,000 mg tablet, Take 1 tablet (1,000 mg total) by mouth 2 (two) times a day for 1 day, Disp: 28 tablet, Rfl: 3     No problem-specific Assessment & Plan notes found for this encounter  Diagnoses and all orders for this visit:    Mild intermittent asthma with exacerbation  -     predniSONE 10 mg tablet; Take 5 tablets for 2 days, 4 tablets for 2 days, 3 for 2 days, 2 for 2 days, 1 for 2 days  -     albuterol (PROVENTIL HFA,VENTOLIN HFA) 90 mcg/act inhaler; Inhale 2 puffs every 4 (four) hours as needed for wheezing or shortness of breath  -     guaifenesin-codeine (GUAIFENESIN AC) 100-10 MG/5ML liquid; 1-2 tsp every 4 hours as needed for cough    Health maintenance examination    Needs flu shot  -     influenza vaccine, quadrivalent, recombinant, PF, 0 5 mL, for patients 18 yr+ (FLUBLOK)    Essential hypertension  -     Basic metabolic panel; Future  -     atenolol (TENORMIN) 50 mg tablet; Take 1 5 tablets (75 mg total) by mouth daily    Gastroesophageal reflux disease without esophagitis    Recurrent major depressive disorder, in full remission ([facility] [address] )    Mixed hyperlipidemia  -     Lipid panel; Future    Palpitations    Encounter for screening mammogram for breast cancer  -     Mammo screening bilateral w 3d & cad; Future    Prediabetes  -     Hemoglobin A1C; Future        Patient Instructions     Health maintenance is performed  Prescription given for mammogram   Colonoscopy up-to-date  Due for second shingles vaccine  She defers today  Flu shot is given today  Medications are reviewed and remain the same except atenolol increased to 1-1/2 tablets daily to try to suppress her extra heartbeats  For her acute respiratory illness, prednisone 10 mg-5, 4, 3, 2, 1-2 days each  Albuterol 2 puffs every 3-4 hours as needed for wheezing  Robitussin with codeine 1 or 2 teaspoons every 4 hours if needed for cough    Continue pseudoephedrine 30 mg, 1 to 2 tablets every 4 hours as needed for postnasal drip and nasal congestion  Okay to use Afrin if nose is blocked  Wellness Visit for Adults   AMBULATORY CARE:   A wellness visit  is when you see your healthcare provider to get screened for health problems  Your healthcare provider will also give you advice on how to stay healthy  Write down your questions so you remember to ask them  Ask your healthcare provider how often you should have a wellness visit  What happens at a wellness visit:  Your healthcare provider will ask about your health, and your family history of health problems  This includes high blood pressure, heart disease, and cancer  He or she will ask if you have symptoms that concern you, if you smoke, and about your mood  You may also be asked about your intake of medicines, supplements, food, and alcohol  Any of the following may be done:  · Your weight  will be checked  Your height may also be checked so your body mass index (BMI) can be calculated  Your BMI shows if you are at a healthy weight  · Your blood pressure  and heart rate will be checked  Your temperature may also be checked  · Blood and urine tests  may be done  Blood tests may be done to check your cholesterol levels  Abnormal cholesterol levels increase your risk for heart disease and stroke  You may also need a blood or urine test to check for diabetes if you are at increased risk  Urine tests may be done to look for signs of an infection or kidney disease  · A physical exam  includes checking your heartbeat and lungs with a stethoscope  Your healthcare provider may also check your skin to look for sun damage  · Screening tests  may be recommended  A screening test is done to check for diseases that may not cause symptoms  The screening tests you may need depend on your age, gender, family history, and lifestyle habits  For example, colorectal screening may be recommended if you are 48years old or older      Screening tests you need if you are a woman:   · A Pap smear is used to screen for cervical cancer  Pap smears are usually done every 3 to 5 years depending on your age  You may need them more often if you have had abnormal Pap smear test results in the past  Ask your healthcare provider how often you should have a Pap smear  · A mammogram  is an x-ray of your breasts to screen for breast cancer  Experts recommend mammograms every 2 years starting at age 48 years  You may need a mammogram at age 52 years or younger if you have an increased risk for breast cancer  Talk to your healthcare provider about when you should start having mammograms and how often you need them  Vaccines you may need:   · Get an influenza vaccine  every year  The influenza vaccine protects you from the flu  Several types of viruses cause the flu  The viruses change over time, so new vaccines are made each year  · Get a tetanus-diphtheria (Td) booster vaccine  every 10 years  This vaccine protects you against tetanus and diphtheria  Tetanus is a severe infection that may cause painful muscle spasms and lockjaw  Diphtheria is a severe bacterial infection that causes a thick covering in the back of your mouth and throat  · Get a human papillomavirus (HPV) vaccine  if you are female and aged 23 to 32 or male 23 to 24 and never received it  This vaccine protects you from HPV infection  HPV is the most common infection spread by sexual contact  HPV may also cause vaginal, penile, and anal cancers  · Get a pneumococcal vaccine  if you are aged 72 years or older  The pneumococcal vaccine is an injection given to protect you from pneumococcal disease  Pneumococcal disease is an infection caused by pneumococcal bacteria  The infection may cause pneumonia, meningitis, or an ear infection  · Get a shingles vaccine  if you are 60 or older, even if you have had shingles before  The shingles vaccine is an injection to protect you from the varicella-zoster virus   This is the same virus that causes chickenpox  Shingles is a painful rash that develops in people who had chickenpox or have been exposed to the virus  How to eat healthy:  My Plate is a model for planning healthy meals  It shows the types and amounts of foods that should go on your plate  Fruits and vegetables make up about half of your plate, and grains and protein make up the other half  A serving of dairy is included on the side of your plate  The amount of calories and serving sizes you need depends on your age, gender, weight, and height  Examples of healthy foods are listed below:  · Eat a variety of vegetables  such as dark green, red, and orange vegetables  You can also include canned vegetables low in sodium (salt) and frozen vegetables without added butter or sauces  · Eat a variety of fresh fruits , canned fruit in 100% juice, frozen fruit, and dried fruit  · Include whole grains  At least half of the grains you eat should be whole grains  Examples include whole-wheat bread, wheat pasta, brown rice, and whole-grain cereals such as oatmeal     · Eat a variety of protein foods such as seafood (fish and shellfish), lean meat, and poultry without skin (turkey and chicken)  Examples of lean meats include pork leg, shoulder, or tenderloin, and beef round, sirloin, tenderloin, and extra lean ground beef  Other protein foods include eggs and egg substitutes, beans, peas, soy products, nuts, and seeds  · Choose low-fat dairy products such as skim or 1% milk or low-fat yogurt, cheese, and cottage cheese  · Limit unhealthy fats  such as butter, hard margarine, and shortening  Exercise:  Exercise at least 30 minutes per day on most days of the week  Some examples of exercise include walking, biking, dancing, and swimming  You can also fit in more physical activity by taking the stairs instead of the elevator or parking farther away from stores  Include muscle strengthening activities 2 days each week   Regular exercise provides many health benefits  It helps you manage your weight, and decreases your risk for type 2 diabetes, heart disease, stroke, and high blood pressure  Exercise can also help improve your mood  Ask your healthcare provider about the best exercise plan for you  General health and safety guidelines:   · Do not smoke  Nicotine and other chemicals in cigarettes and cigars can cause lung damage  Ask your healthcare provider for information if you currently smoke and need help to quit  E-cigarettes or smokeless tobacco still contain nicotine  Talk to your healthcare provider before you use these products  · Limit alcohol  A drink of alcohol is 12 ounces of beer, 5 ounces of wine, or 1½ ounces of liquor  · Lose weight, if needed  Being overweight increases your risk of certain health conditions  These include heart disease, high blood pressure, type 2 diabetes, and certain types of cancer  · Protect your skin  Do not sunbathe or use tanning beds  Use sunscreen with a SPF 15 or higher  Apply sunscreen at least 15 minutes before you go outside  Reapply sunscreen every 2 hours  Wear protective clothing, hats, and sunglasses when you are outside  · Drive safely  Always wear your seatbelt  Make sure everyone in your car wears a seatbelt  A seatbelt can save your life if you are in an accident  Do not use your cell phone when you are driving  This could distract you and cause an accident  Pull over if you need to make a call or send a text message  · Practice safe sex  Use latex condoms if are sexually active and have more than one partner  Your healthcare provider may recommend screening tests for sexually transmitted infections (STIs)  · Wear helmets, lifejackets, and protective gear  Always wear a helmet when you ride a bike or motorcycle, go skiing, or play sports that could cause a head injury  Wear protective equipment when you play sports   Wear a lifejacket when you are on a boat or doing water sports  © Copyright [employer] 2022 Information is for End User's use only and may not be sold, redistributed or otherwise used for commercial purposes  All illustrations and images included in CareNotes® are the copyrighted property of A D A M [employer] Inc  or [employer]  The above information is an  only  It is not intended as medical advice for individual conditions or treatments  Talk to your doctor, nurse or pharmacist before following any medical regimen to see if it is safe and effective for you

## 2022-12-12 NOTE — PATIENT INSTRUCTIONS
Health maintenance is performed  Prescription given for mammogram   Colonoscopy up-to-date  Due for second shingles vaccine  She defers today  Flu shot is given today  Medications are reviewed and remain the same except atenolol increased to 1-1/2 tablets daily to try to suppress her extra heartbeats  For her acute respiratory illness, prednisone 10 mg-5, 4, 3, 2, 1-2 days each  Albuterol 2 puffs every 3-4 hours as needed for wheezing  Robitussin with codeine 1 or 2 teaspoons every 4 hours if needed for cough  Continue pseudoephedrine 30 mg, 1 to 2 tablets every 4 hours as needed for postnasal drip and nasal congestion  Okay to use Afrin if nose is blocked  Wellness Visit for Adults   AMBULATORY CARE:   A wellness visit  is when you see your healthcare provider to get screened for health problems  Your healthcare provider will also give you advice on how to stay healthy  Write down your questions so you remember to ask them  Ask your healthcare provider how often you should have a wellness visit  What happens at a wellness visit:  Your healthcare provider will ask about your health, and your family history of health problems  This includes high blood pressure, heart disease, and cancer  He or she will ask if you have symptoms that concern you, if you smoke, and about your mood  You may also be asked about your intake of medicines, supplements, food, and alcohol  Any of the following may be done: Your weight  will be checked  Your height may also be checked so your body mass index (BMI) can be calculated  Your BMI shows if you are at a healthy weight  Your blood pressure  and heart rate will be checked  Your temperature may also be checked  Blood and urine tests  may be done  Blood tests may be done to check your cholesterol levels  Abnormal cholesterol levels increase your risk for heart disease and stroke   You may also need a blood or urine test to check for diabetes if you are at increased risk  Urine tests may be done to look for signs of an infection or kidney disease  A physical exam  includes checking your heartbeat and lungs with a stethoscope  Your healthcare provider may also check your skin to look for sun damage  Screening tests  may be recommended  A screening test is done to check for diseases that may not cause symptoms  The screening tests you may need depend on your age, gender, family history, and lifestyle habits  For example, colorectal screening may be recommended if you are 48years old or older  Screening tests you need if you are a woman:   A Pap smear  is used to screen for cervical cancer  Pap smears are usually done every 3 to 5 years depending on your age  You may need them more often if you have had abnormal Pap smear test results in the past  Ask your healthcare provider how often you should have a Pap smear  A mammogram  is an x-ray of your breasts to screen for breast cancer  Experts recommend mammograms every 2 years starting at age 48 years  You may need a mammogram at age 52 years or younger if you have an increased risk for breast cancer  Talk to your healthcare provider about when you should start having mammograms and how often you need them  Vaccines you may need:   Get an influenza vaccine  every year  The influenza vaccine protects you from the flu  Several types of viruses cause the flu  The viruses change over time, so new vaccines are made each year  Get a tetanus-diphtheria (Td) booster vaccine  every 10 years  This vaccine protects you against tetanus and diphtheria  Tetanus is a severe infection that may cause painful muscle spasms and lockjaw  Diphtheria is a severe bacterial infection that causes a thick covering in the back of your mouth and throat  Get a human papillomavirus (HPV) vaccine  if you are female and aged 23 to 32 or male 23 to 24 and never received it  This vaccine protects you from HPV infection   HPV is the most common infection spread by sexual contact  HPV may also cause vaginal, penile, and anal cancers  Get a pneumococcal vaccine  if you are aged 72 years or older  The pneumococcal vaccine is an injection given to protect you from pneumococcal disease  Pneumococcal disease is an infection caused by pneumococcal bacteria  The infection may cause pneumonia, meningitis, or an ear infection  Get a shingles vaccine  if you are 60 or older, even if you have had shingles before  The shingles vaccine is an injection to protect you from the varicella-zoster virus  This is the same virus that causes chickenpox  Shingles is a painful rash that develops in people who had chickenpox or have been exposed to the virus  How to eat healthy:  My Plate is a model for planning healthy meals  It shows the types and amounts of foods that should go on your plate  Fruits and vegetables make up about half of your plate, and grains and protein make up the other half  A serving of dairy is included on the side of your plate  The amount of calories and serving sizes you need depends on your age, gender, weight, and height  Examples of healthy foods are listed below:  Eat a variety of vegetables  such as dark green, red, and orange vegetables  You can also include canned vegetables low in sodium (salt) and frozen vegetables without added butter or sauces  Eat a variety of fresh fruits , canned fruit in 100% juice, frozen fruit, and dried fruit  Include whole grains  At least half of the grains you eat should be whole grains  Examples include whole-wheat bread, wheat pasta, brown rice, and whole-grain cereals such as oatmeal     Eat a variety of protein foods such as seafood (fish and shellfish), lean meat, and poultry without skin (turkey and chicken)  Examples of lean meats include pork leg, shoulder, or tenderloin, and beef round, sirloin, tenderloin, and extra lean ground beef   Other protein foods include eggs and egg substitutes, beans, peas, soy products, nuts, and seeds  Choose low-fat dairy products such as skim or 1% milk or low-fat yogurt, cheese, and cottage cheese  Limit unhealthy fats  such as butter, hard margarine, and shortening  Exercise:  Exercise at least 30 minutes per day on most days of the week  Some examples of exercise include walking, biking, dancing, and swimming  You can also fit in more physical activity by taking the stairs instead of the elevator or parking farther away from stores  Include muscle strengthening activities 2 days each week  Regular exercise provides many health benefits  It helps you manage your weight, and decreases your risk for type 2 diabetes, heart disease, stroke, and high blood pressure  Exercise can also help improve your mood  Ask your healthcare provider about the best exercise plan for you  General health and safety guidelines:   Do not smoke  Nicotine and other chemicals in cigarettes and cigars can cause lung damage  Ask your healthcare provider for information if you currently smoke and need help to quit  E-cigarettes or smokeless tobacco still contain nicotine  Talk to your healthcare provider before you use these products  Limit alcohol  A drink of alcohol is 12 ounces of beer, 5 ounces of wine, or 1½ ounces of liquor  Lose weight, if needed  Being overweight increases your risk of certain health conditions  These include heart disease, high blood pressure, type 2 diabetes, and certain types of cancer  Protect your skin  Do not sunbathe or use tanning beds  Use sunscreen with a SPF 15 or higher  Apply sunscreen at least 15 minutes before you go outside  Reapply sunscreen every 2 hours  Wear protective clothing, hats, and sunglasses when you are outside  Drive safely  Always wear your seatbelt  Make sure everyone in your car wears a seatbelt  A seatbelt can save your life if you are in an accident  Do not use your cell phone when you are driving   This could distract you and cause an accident  Pull over if you need to make a call or send a text message  Practice safe sex  Use latex condoms if are sexually active and have more than one partner  Your healthcare provider may recommend screening tests for sexually transmitted infections (STIs)  Wear helmets, lifejackets, and protective gear  Always wear a helmet when you ride a bike or motorcycle, go skiing, or play sports that could cause a head injury  Wear protective equipment when you play sports  Wear a lifejacket when you are on a boat or doing water sports  © Copyright [employer] 2022 Information is for End User's use only and may not be sold, redistributed or otherwise used for commercial purposes  All illustrations and images included in CareNotes® are the copyrighted property of [employer]  or [employer]  The above information is an  only  It is not intended as medical advice for individual conditions or treatments  Talk to your doctor, nurse or pharmacist before following any medical regimen to see if it is safe and effective for you

## 2022-12-20 ENCOUNTER — APPOINTMENT (OUTPATIENT)
Dept: LAB | Facility: CLINIC | Age: 62
End: 2022-12-20

## 2022-12-20 ENCOUNTER — OCCMED (OUTPATIENT)
Dept: URGENT CARE | Facility: CLINIC | Age: 62
End: 2022-12-20

## 2022-12-20 DIAGNOSIS — Z02.1 PRE-EMPLOYMENT HEALTH SCREENING EXAMINATION: Primary | ICD-10-CM

## 2022-12-20 DIAGNOSIS — Z02.1 PRE-EMPLOYMENT HEALTH SCREENING EXAMINATION: ICD-10-CM

## 2022-12-20 LAB — RUBV IGG SERPL IA-ACNC: >175 IU/ML

## 2022-12-21 LAB
MEV IGG SER QL IA: NORMAL
MUV IGG SER QL IA: NORMAL
VZV IGG SER QL IA: NORMAL

## 2022-12-22 LAB
GAMMA INTERFERON BACKGROUND BLD IA-ACNC: 0.06 IU/ML
M TB IFN-G BLD-IMP: NEGATIVE
M TB IFN-G CD4+ BCKGRND COR BLD-ACNC: 0.07 IU/ML
M TB IFN-G CD4+ BCKGRND COR BLD-ACNC: 0.12 IU/ML
MITOGEN IGNF BCKGRD COR BLD-ACNC: >10 IU/ML

## 2023-02-21 ENCOUNTER — TELEPHONE (OUTPATIENT)
Dept: FAMILY MEDICINE CLINIC | Facility: CLINIC | Age: 63
End: 2023-02-21

## 2023-02-21 DIAGNOSIS — J45.21 MILD INTERMITTENT ASTHMA WITH EXACERBATION: ICD-10-CM

## 2023-02-21 RX ORDER — GUAIFENESIN AND CODEINE PHOSPHATE 100; 10 MG/5ML; MG/5ML
SOLUTION ORAL
Qty: 240 ML | Refills: 0 | Status: SHIPPED | OUTPATIENT
Start: 2023-02-21

## 2023-02-21 NOTE — TELEPHONE ENCOUNTER
Patient called in asking for a refill of her cough medicine  She said her cough has returned from the last time she was sick and this helped her

## 2023-05-11 ENCOUNTER — TELEPHONE (OUTPATIENT)
Dept: FAMILY MEDICINE CLINIC | Facility: CLINIC | Age: 63
End: 2023-05-11

## 2023-05-11 DIAGNOSIS — N30.00 ACUTE CYSTITIS WITHOUT HEMATURIA: Primary | ICD-10-CM

## 2023-05-11 RX ORDER — SULFAMETHOXAZOLE AND TRIMETHOPRIM 800; 160 MG/1; MG/1
1 TABLET ORAL EVERY 12 HOURS SCHEDULED
Qty: 6 TABLET | Refills: 0 | Status: SHIPPED | OUTPATIENT
Start: 2023-05-11 | End: 2023-05-14

## 2023-05-11 NOTE — TELEPHONE ENCOUNTER
Patient having UTI sx for several days  Cannot get in to office today due to work schedule  Recommended going to [facility] to be seen today  She has not started any OTC treatments yet

## 2023-05-12 NOTE — TELEPHONE ENCOUNTER
I left a detailed message for patient saying that Dr Pop Fitzgerald sent a 3 day treatment to the pharmacy for her symptoms, and if she needs any details on the instructions she can call us back

## 2023-05-15 DIAGNOSIS — E78.2 MIXED HYPERLIPIDEMIA: ICD-10-CM

## 2023-05-15 DIAGNOSIS — I10 ESSENTIAL HYPERTENSION: ICD-10-CM

## 2023-05-15 DIAGNOSIS — K21.9 GASTROESOPHAGEAL REFLUX DISEASE WITHOUT ESOPHAGITIS: ICD-10-CM

## 2023-05-15 DIAGNOSIS — G43.909 MIGRAINE WITHOUT STATUS MIGRAINOSUS, NOT INTRACTABLE, UNSPECIFIED MIGRAINE TYPE: ICD-10-CM

## 2023-05-15 DIAGNOSIS — F41.1 ANXIETY STATE: ICD-10-CM

## 2023-05-15 DIAGNOSIS — F32.5 MAJOR DEPRESSION IN COMPLETE REMISSION (HCC): ICD-10-CM

## 2023-05-16 RX ORDER — BUPROPION HYDROCHLORIDE 150 MG/1
150 TABLET ORAL DAILY
Qty: 90 TABLET | Refills: 0 | Status: SHIPPED | OUTPATIENT
Start: 2023-05-16

## 2023-05-16 RX ORDER — ATENOLOL 50 MG/1
75 TABLET ORAL DAILY
Qty: 90 TABLET | Refills: 0 | Status: SHIPPED | OUTPATIENT
Start: 2023-05-16

## 2023-05-16 RX ORDER — FLUOXETINE HYDROCHLORIDE 20 MG/1
20 CAPSULE ORAL DAILY
Qty: 90 CAPSULE | Refills: 0 | Status: SHIPPED | OUTPATIENT
Start: 2023-05-16

## 2023-05-16 RX ORDER — ATORVASTATIN CALCIUM 10 MG/1
10 TABLET, FILM COATED ORAL DAILY
Qty: 90 TABLET | Refills: 0 | Status: SHIPPED | OUTPATIENT
Start: 2023-05-16

## 2023-05-16 RX ORDER — OMEPRAZOLE 40 MG/1
40 CAPSULE, DELAYED RELEASE ORAL DAILY
Qty: 90 CAPSULE | Refills: 0 | Status: SHIPPED | OUTPATIENT
Start: 2023-05-16

## 2023-05-16 RX ORDER — TOPIRAMATE 50 MG/1
50 TABLET, FILM COATED ORAL DAILY
Qty: 90 TABLET | Refills: 0 | Status: SHIPPED | OUTPATIENT
Start: 2023-05-16

## 2023-05-17 ENCOUNTER — VBI (OUTPATIENT)
Dept: ADMINISTRATIVE | Facility: OTHER | Age: 63
End: 2023-05-17

## 2023-07-20 ENCOUNTER — HOSPITAL ENCOUNTER (OUTPATIENT)
Dept: MAMMOGRAPHY | Facility: CLINIC | Age: 63
End: 2023-07-20
Payer: COMMERCIAL

## 2023-07-20 VITALS — HEIGHT: 65 IN | WEIGHT: 166 LBS | BODY MASS INDEX: 27.66 KG/M2

## 2023-07-20 DIAGNOSIS — Z12.31 ENCOUNTER FOR SCREENING MAMMOGRAM FOR BREAST CANCER: ICD-10-CM

## 2023-07-20 PROCEDURE — 77063 BREAST TOMOSYNTHESIS BI: CPT

## 2023-07-20 PROCEDURE — 77067 SCR MAMMO BI INCL CAD: CPT

## 2023-08-17 DIAGNOSIS — F41.1 ANXIETY STATE: ICD-10-CM

## 2023-08-17 DIAGNOSIS — E78.2 MIXED HYPERLIPIDEMIA: ICD-10-CM

## 2023-08-17 DIAGNOSIS — G43.909 MIGRAINE WITHOUT STATUS MIGRAINOSUS, NOT INTRACTABLE, UNSPECIFIED MIGRAINE TYPE: ICD-10-CM

## 2023-08-17 DIAGNOSIS — K21.9 GASTROESOPHAGEAL REFLUX DISEASE WITHOUT ESOPHAGITIS: ICD-10-CM

## 2023-08-17 DIAGNOSIS — F32.5 MAJOR DEPRESSION IN COMPLETE REMISSION (HCC): ICD-10-CM

## 2023-08-17 DIAGNOSIS — I10 ESSENTIAL HYPERTENSION: ICD-10-CM

## 2023-08-17 RX ORDER — FLUOXETINE HYDROCHLORIDE 20 MG/1
20 CAPSULE ORAL DAILY
Qty: 90 CAPSULE | Refills: 0 | Status: SHIPPED | OUTPATIENT
Start: 2023-08-17

## 2023-08-17 RX ORDER — BUPROPION HYDROCHLORIDE 150 MG/1
150 TABLET ORAL DAILY
Qty: 90 TABLET | Refills: 0 | Status: SHIPPED | OUTPATIENT
Start: 2023-08-17

## 2023-08-17 RX ORDER — ATENOLOL 50 MG/1
75 TABLET ORAL DAILY
Qty: 90 TABLET | Refills: 0 | Status: SHIPPED | OUTPATIENT
Start: 2023-08-17

## 2023-08-17 RX ORDER — ELETRIPTAN HYDROBROMIDE 40 MG/1
40 TABLET, FILM COATED ORAL ONCE AS NEEDED
Qty: 18 TABLET | Refills: 0 | Status: SHIPPED | OUTPATIENT
Start: 2023-08-17

## 2023-08-17 RX ORDER — OMEPRAZOLE 40 MG/1
40 CAPSULE, DELAYED RELEASE ORAL DAILY
Qty: 90 CAPSULE | Refills: 0 | Status: SHIPPED | OUTPATIENT
Start: 2023-08-17

## 2023-08-17 RX ORDER — ATORVASTATIN CALCIUM 10 MG/1
10 TABLET, FILM COATED ORAL DAILY
Qty: 90 TABLET | Refills: 0 | Status: SHIPPED | OUTPATIENT
Start: 2023-08-17

## 2023-08-17 RX ORDER — TOPIRAMATE 50 MG/1
50 TABLET, FILM COATED ORAL DAILY
Qty: 90 TABLET | Refills: 0 | Status: SHIPPED | OUTPATIENT
Start: 2023-08-17

## 2023-08-21 ENCOUNTER — OFFICE VISIT (OUTPATIENT)
Dept: FAMILY MEDICINE CLINIC | Facility: CLINIC | Age: 63
End: 2023-08-21
Payer: COMMERCIAL

## 2023-08-21 VITALS
HEIGHT: 65 IN | SYSTOLIC BLOOD PRESSURE: 124 MMHG | TEMPERATURE: 97.5 F | DIASTOLIC BLOOD PRESSURE: 70 MMHG | WEIGHT: 173.2 LBS | HEART RATE: 68 BPM | OXYGEN SATURATION: 97 % | BODY MASS INDEX: 28.86 KG/M2

## 2023-08-21 DIAGNOSIS — E66.3 OVERWEIGHT (BMI 25.0-29.9): Primary | ICD-10-CM

## 2023-08-21 DIAGNOSIS — I10 ESSENTIAL HYPERTENSION: ICD-10-CM

## 2023-08-21 DIAGNOSIS — L91.8 SKIN TAGS, MULTIPLE ACQUIRED: ICD-10-CM

## 2023-08-21 DIAGNOSIS — F33.42 RECURRENT MAJOR DEPRESSIVE DISORDER, IN FULL REMISSION (HCC): ICD-10-CM

## 2023-08-21 PROCEDURE — 99214 OFFICE O/P EST MOD 30 MIN: CPT | Performed by: FAMILY MEDICINE

## 2023-08-21 RX ORDER — PHENTERMINE HYDROCHLORIDE 37.5 MG/1
37.5 TABLET ORAL DAILY
Qty: 30 TABLET | Refills: 0 | Status: SHIPPED | OUTPATIENT
Start: 2023-08-21

## 2023-08-21 NOTE — PROGRESS NOTES
Chief Complaint   Patient presents with   • Follow-up     Check skin tags        HPI   Here with a couple concerns. Has some gross on her arm she would like checked out. Also unhappy with her weight. Has gained about 10 pounds in the last 3 years. Would like to try weight loss medication. Continues on usual antidepressants. Depression is well controlled. Likes her present job at [employer]. Migraines are controlled. Takes her usual blood pressure and cholesterol medication. Past Medical History:   Diagnosis Date   • Anxiety state 1/9/2015   • Esophageal reflux 4/28/2014   • Essential hypertension 2/26/2016   • Herpes simplex 6/12/2012   • Major depression in complete remission ([address]) 02/21/2014   • Migraine 3/20/2015   • Mixed hyperlipidemia 6/11/2010   • Obstructive sleep apnea syndrome 2/26/2016   • Palpitations 3/20/2015   • Personal history of colonic polyps 10/20/2020   • Sensorineural hearing loss 10/30/2014   • Trigeminal neuralgia of left side of face 08/25/2016   • Vitamin D deficiency 4/29/2014        Past Surgical History:   Procedure Laterality Date   • APPENDECTOMY     • EXOSTECTOMY Right 2004    Fifth toe   • LAPAROSCOPIC CHOLECYSTECTOMY  2006   • LAPAROSCOPIC HYSTERECTOMY  2007   • TONSILLECTOMY         Social History     Tobacco Use   • Smoking status: Never   • Smokeless tobacco: Never   Substance Use Topics   • Alcohol use: Yes       Social History     Social History Narrative    Citlalli Pascal since 8/13.  since 10/7/17. He has 3 grown children. and 5 grandchildren. Merari Piety  retired in 322. 3years older. Has some lung issues. First  committed suicide after they were . 2 sons . 1 grandson born 6/21/20.    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           The following portions of the patient's history were reviewed and updated as appropriate: allergies, current medications, past family history, past medical history, past social history, past surgical history and problem list.      Review of Systems       /70 (BP Location: Left arm, Patient Position: Sitting, Cuff Size: Standard)   Pulse 68   Temp 97.5 °F (36.4 °C) (Temporal)   Ht 5' 5" (1.651 m)   Wt 78.6 kg (173 lb 3.2 oz)   SpO2 97%   BMI 28.82 kg/m²      Physical Exam   Appears well. Multiple skin tags noted in the back of her neck and on both sides reaching around in the area of her necklace. Seborrheic keratoses noted on torso and arms. And leg. Lesion Destruction    Date/Time: 8/21/2023 4:40 PM    Performed by: Diana Florence MD  Authorized by: Diana Florence MD    Procedure Details - Lesion Destruction:     Number of Lesions:  0    70 or more       20 skin tags in the back and both sides of the neck were snipped with scissors.                   Current Outpatient Medications:   •  albuterol (PROVENTIL HFA,VENTOLIN HFA) 90 mcg/act inhaler, Inhale 2 puffs every 4 (four) hours as needed for wheezing or shortness of breath, Disp: 18 g, Rfl: 5  •  atenolol (TENORMIN) 50 mg tablet, Take 1.5 tablets (75 mg total) by mouth daily, Disp: 90 tablet, Rfl: 0  •  atorvastatin (LIPITOR) 10 mg tablet, Take 1 tablet (10 mg total) by mouth daily, Disp: 90 tablet, Rfl: 0  •  buPROPion (WELLBUTRIN XL) 150 mg 24 hr tablet, Take 1 tablet (150 mg total) by mouth daily, Disp: 90 tablet, Rfl: 0  •  eletriptan (RELPAX) 40 MG tablet, Take 1 tablet (40 mg total) by mouth once as needed for migraine for up to 1 dose may repeat in 2 hours if necessary, Disp: 18 tablet, Rfl: 0  •  FLUoxetine (PROzac) 20 mg capsule, Take 1 capsule (20 mg total) by mouth daily, Disp: 90 capsule, Rfl: 0  •  omeprazole (PriLOSEC) 40 MG capsule, Take 1 capsule (40 mg total) by mouth daily, Disp: 90 capsule, Rfl: 0  •  topiramate (TOPAMAX) 50 MG tablet, Take 1 tablet (50 mg total) by mouth daily, Disp: 90 tablet, Rfl: 0  •  valACYclovir (VALTREX) 1,000 mg tablet, Take 1 tablet (1,000 mg total) by mouth 2 (two) times a day for 1 day, Disp: 28 tablet, Rfl: 3     No problem-specific Assessment & Plan notes found for this encounter. Diagnoses and all orders for this visit:    Overweight (BMI 25.0-29. 9)    Skin tags, multiple acquired    Recurrent major depressive disorder, in full remission ([address])    Essential hypertension    Other orders  -     Lesion Destruction        Patient Instructions   Trial of phentermine 37.5 mg once daily. Goal weight loss for the next month would be 4 pounds. Her goal reasonably would be 155. Skin tags removed. She has seborrheic keratoses which are benign lesions on her arm and torso which can be frozen at a follow-up visit. Recheck 1 month.

## 2023-08-21 NOTE — PATIENT INSTRUCTIONS
Trial of phentermine 37.5 mg once daily. Goal weight loss for the next month would be 4 pounds. Her goal reasonably would be 155. Skin tags removed. She has seborrheic keratoses which are benign lesions on her arm and torso which can be frozen at a follow-up visit. Recheck 1 month.

## 2023-09-25 DIAGNOSIS — E66.3 OVERWEIGHT (BMI 25.0-29.9): ICD-10-CM

## 2023-09-25 RX ORDER — PHENTERMINE HYDROCHLORIDE 37.5 MG/1
37.5 TABLET ORAL DAILY
Qty: 30 TABLET | Refills: 0 | Status: SHIPPED | OUTPATIENT
Start: 2023-09-25

## 2023-09-27 ENCOUNTER — LAB (OUTPATIENT)
Dept: LAB | Facility: HOSPITAL | Age: 63
End: 2023-09-27
Payer: COMMERCIAL

## 2023-09-27 DIAGNOSIS — E78.2 MIXED HYPERLIPIDEMIA: ICD-10-CM

## 2023-09-27 DIAGNOSIS — R73.03 PREDIABETES: ICD-10-CM

## 2023-09-27 DIAGNOSIS — I10 ESSENTIAL HYPERTENSION: ICD-10-CM

## 2023-09-27 PROCEDURE — 80048 BASIC METABOLIC PNL TOTAL CA: CPT

## 2023-09-27 PROCEDURE — 80061 LIPID PANEL: CPT

## 2023-10-05 ENCOUNTER — APPOINTMENT (OUTPATIENT)
Dept: LAB | Facility: HOSPITAL | Age: 63
End: 2023-10-05
Payer: COMMERCIAL

## 2023-10-05 LAB
ANION GAP SERPL CALCULATED.3IONS-SCNC: 8 MMOL/L
BUN SERPL-MCNC: 19 MG/DL (ref 5–25)
CALCIUM SERPL-MCNC: 9.5 MG/DL (ref 8.4–10.2)
CHLORIDE SERPL-SCNC: 108 MMOL/L (ref 96–108)
CHOLEST SERPL-MCNC: 153 MG/DL
CO2 SERPL-SCNC: 24 MMOL/L (ref 21–32)
CREAT SERPL-MCNC: 0.9 MG/DL (ref 0.6–1.3)
EST. AVERAGE GLUCOSE BLD GHB EST-MCNC: 117 MG/DL
GFR SERPL CREATININE-BSD FRML MDRD: 69 ML/MIN/1.73SQ M
GLUCOSE P FAST SERPL-MCNC: 112 MG/DL (ref 65–99)
HBA1C MFR BLD: 5.7 %
HDLC SERPL-MCNC: 30 MG/DL
LDLC SERPL CALC-MCNC: 75 MG/DL (ref 0–100)
NONHDLC SERPL-MCNC: 123 MG/DL
POTASSIUM SERPL-SCNC: 4.4 MMOL/L (ref 3.5–5.3)
SODIUM SERPL-SCNC: 140 MMOL/L (ref 135–147)
TRIGL SERPL-MCNC: 238 MG/DL

## 2023-10-05 PROCEDURE — 36415 COLL VENOUS BLD VENIPUNCTURE: CPT

## 2023-10-05 PROCEDURE — 83036 HEMOGLOBIN GLYCOSYLATED A1C: CPT

## 2023-10-13 ENCOUNTER — OFFICE VISIT (OUTPATIENT)
Dept: FAMILY MEDICINE CLINIC | Facility: CLINIC | Age: 63
End: 2023-10-13
Payer: COMMERCIAL

## 2023-10-13 VITALS
DIASTOLIC BLOOD PRESSURE: 78 MMHG | HEIGHT: 65 IN | TEMPERATURE: 96.8 F | BODY MASS INDEX: 28.42 KG/M2 | WEIGHT: 170.6 LBS | HEART RATE: 66 BPM | OXYGEN SATURATION: 97 % | SYSTOLIC BLOOD PRESSURE: 120 MMHG

## 2023-10-13 DIAGNOSIS — R05.1 ACUTE COUGH: Primary | ICD-10-CM

## 2023-10-13 DIAGNOSIS — R35.0 URINARY FREQUENCY: ICD-10-CM

## 2023-10-13 LAB
BACTERIA UR QL AUTO: ABNORMAL /HPF
BILIRUB UR QL STRIP: ABNORMAL
CLARITY UR: ABNORMAL
COLOR UR: ABNORMAL
GLUCOSE UR STRIP-MCNC: NEGATIVE MG/DL
HGB UR QL STRIP.AUTO: ABNORMAL
KETONES UR STRIP-MCNC: NEGATIVE MG/DL
LEUKOCYTE ESTERASE UR QL STRIP: ABNORMAL
MUCOUS THREADS UR QL AUTO: ABNORMAL
NITRITE UR QL STRIP: POSITIVE
NON-SQ EPI CELLS URNS QL MICRO: ABNORMAL /HPF
PH UR STRIP.AUTO: 6.5 [PH]
PROT UR STRIP-MCNC: ABNORMAL MG/DL
RBC #/AREA URNS AUTO: ABNORMAL /HPF
SP GR UR STRIP.AUTO: 1.02 (ref 1–1.03)
UROBILINOGEN UR STRIP-ACNC: 12 MG/DL
WBC #/AREA URNS AUTO: ABNORMAL /HPF

## 2023-10-13 PROCEDURE — 87086 URINE CULTURE/COLONY COUNT: CPT | Performed by: FAMILY MEDICINE

## 2023-10-13 PROCEDURE — 81001 URINALYSIS AUTO W/SCOPE: CPT | Performed by: FAMILY MEDICINE

## 2023-10-13 PROCEDURE — 99214 OFFICE O/P EST MOD 30 MIN: CPT | Performed by: FAMILY MEDICINE

## 2023-10-13 PROCEDURE — 87077 CULTURE AEROBIC IDENTIFY: CPT | Performed by: FAMILY MEDICINE

## 2023-10-13 PROCEDURE — 87186 SC STD MICRODIL/AGAR DIL: CPT | Performed by: FAMILY MEDICINE

## 2023-10-13 RX ORDER — GUAIFENESIN AND CODEINE PHOSPHATE 100; 10 MG/5ML; MG/5ML
5 SOLUTION ORAL 3 TIMES DAILY PRN
Qty: 118 ML | Refills: 0 | Status: SHIPPED | OUTPATIENT
Start: 2023-10-13

## 2023-10-13 RX ORDER — AZITHROMYCIN 250 MG/1
TABLET, FILM COATED ORAL
Qty: 6 TABLET | Refills: 0 | Status: SHIPPED | OUTPATIENT
Start: 2023-10-13 | End: 2023-10-18

## 2023-10-13 NOTE — PROGRESS NOTES
Assessment/Plan:    No problem-specific Assessment & Plan notes found for this encounter. Diagnoses and all orders for this visit:    Acute cough  -     azithromycin (Zithromax) 250 mg tablet; Take 2 tablets (500 mg total) by mouth daily for 1 day, THEN 1 tablet (250 mg total) daily for 4 days.  -     guaifenesin-codeine (GUAIFENESIN AC) 100-10 MG/5ML liquid; Take 5 mL by mouth 3 (three) times a day as needed for cough    Urinary frequency  -     Urinalysis with microscopic  -     Urine culture        - Start course of Azithromycin. Patient may continue supportive care with plenty of hydration, honey etc. Will also prescribe guaifenesin-codeine for symptomatic relief. Return if symptoms fail to resolve or worsen. - UA with microscopy and urine culture collected at today's office visit; will follow up with patient with results. Subjective:      Patient ID: Mason Cevallos is a 66 y.o. female. HPI  Mason Cevallos is a pleasant 66year old female who presents today for a sick visit. Today patient is complaining of productive cough for the past week which has not been improving despite using over the counter mucinex DM. She reports that the sputum is white in color but denies any philly blood. She denies any shortness of breath or chest tightness. She also reports purulent nasal discharge which has been brown in color. She denies any sinus pain/pressure. In addition the mucinex she has been trying to keep hydrated and has also been using albuterol inhaler to no avail. In addition to the cough she also reports that she has been experiencing urinary frequency and burning on urination. She denies any pelvic pressure, flank pain, fevers or chills. She has been taking over the counter Azo for her symptoms.      The following portions of the patient's history were reviewed and updated as appropriate: allergies, current medications, past family history, past medical history, past social history, past surgical history, and problem list.    Review of Systems   Constitutional: Negative. HENT: Negative. Eyes: Negative. Respiratory:  Positive for cough. Cardiovascular: Negative. Gastrointestinal: Negative. Genitourinary:  Positive for dysuria and frequency. Negative for flank pain. Musculoskeletal: Negative. Skin: Negative. Neurological: Negative. Psychiatric/Behavioral: Negative. Objective:      /78 (BP Location: Left arm, Patient Position: Sitting, Cuff Size: Large)   Pulse 66   Temp (!) 96.8 °F (36 °C) (Temporal)   Ht 5' 5" (1.651 m)   Wt 77.4 kg (170 lb 9.6 oz)   SpO2 97%   BMI 28.39 kg/m²          Physical Exam  Constitutional:       General: She is not in acute distress. Appearance: She is not ill-appearing. HENT:      Head: Normocephalic and atraumatic. Eyes:      General:         Right eye: No discharge. Left eye: No discharge. Extraocular Movements: Extraocular movements intact. Cardiovascular:      Rate and Rhythm: Normal rate. Pulmonary:      Effort: Pulmonary effort is normal. No respiratory distress. Breath sounds: No wheezing. Comments: Coughing throughout exam  Abdominal:      General: There is no distension. Palpations: Abdomen is soft. Tenderness: There is no abdominal tenderness. There is no right CVA tenderness or left CVA tenderness. Musculoskeletal:      Right lower leg: No edema. Left lower leg: No edema. Neurological:      General: No focal deficit present. Mental Status: She is alert.    Psychiatric:         Mood and Affect: Mood normal.         Behavior: Behavior normal.

## 2023-10-15 LAB
BACTERIA UR CULT: ABNORMAL
BACTERIA UR CULT: ABNORMAL

## 2023-10-17 DIAGNOSIS — R35.0 URINARY FREQUENCY: Primary | ICD-10-CM

## 2023-10-17 RX ORDER — NITROFURANTOIN 25; 75 MG/1; MG/1
100 CAPSULE ORAL 2 TIMES DAILY
Qty: 10 CAPSULE | Refills: 0 | Status: SHIPPED | OUTPATIENT
Start: 2023-10-17 | End: 2023-10-22

## 2023-10-30 DIAGNOSIS — R05.1 ACUTE COUGH: ICD-10-CM

## 2023-10-30 DIAGNOSIS — E66.3 OVERWEIGHT (BMI 25.0-29.9): ICD-10-CM

## 2023-10-30 RX ORDER — GUAIFENESIN AND CODEINE PHOSPHATE 100; 10 MG/5ML; MG/5ML
5 SOLUTION ORAL 3 TIMES DAILY PRN
Qty: 118 ML | Refills: 0 | Status: SHIPPED | OUTPATIENT
Start: 2023-10-30

## 2023-10-30 RX ORDER — PHENTERMINE HYDROCHLORIDE 37.5 MG/1
37.5 TABLET ORAL DAILY
Qty: 30 TABLET | Refills: 0 | Status: SHIPPED | OUTPATIENT
Start: 2023-10-30

## 2023-11-17 DIAGNOSIS — E78.2 MIXED HYPERLIPIDEMIA: ICD-10-CM

## 2023-11-17 DIAGNOSIS — F32.5 MAJOR DEPRESSION IN COMPLETE REMISSION (HCC): ICD-10-CM

## 2023-11-17 DIAGNOSIS — G43.909 MIGRAINE WITHOUT STATUS MIGRAINOSUS, NOT INTRACTABLE, UNSPECIFIED MIGRAINE TYPE: ICD-10-CM

## 2023-11-17 DIAGNOSIS — I10 ESSENTIAL HYPERTENSION: ICD-10-CM

## 2023-11-17 DIAGNOSIS — K21.9 GASTROESOPHAGEAL REFLUX DISEASE WITHOUT ESOPHAGITIS: ICD-10-CM

## 2023-11-17 DIAGNOSIS — F41.1 ANXIETY STATE: ICD-10-CM

## 2023-11-20 RX ORDER — FLUOXETINE HYDROCHLORIDE 20 MG/1
20 CAPSULE ORAL DAILY
Qty: 90 CAPSULE | Refills: 0 | Status: SHIPPED | OUTPATIENT
Start: 2023-11-20 | End: 2023-11-22 | Stop reason: SDUPTHER

## 2023-11-20 RX ORDER — ATORVASTATIN CALCIUM 10 MG/1
10 TABLET, FILM COATED ORAL DAILY
Qty: 90 TABLET | Refills: 0 | Status: SHIPPED | OUTPATIENT
Start: 2023-11-20 | End: 2023-11-22 | Stop reason: SDUPTHER

## 2023-11-20 RX ORDER — OMEPRAZOLE 40 MG/1
40 CAPSULE, DELAYED RELEASE ORAL DAILY
Qty: 90 CAPSULE | Refills: 0 | Status: SHIPPED | OUTPATIENT
Start: 2023-11-20 | End: 2023-11-22 | Stop reason: SDUPTHER

## 2023-11-20 RX ORDER — TOPIRAMATE 50 MG/1
50 TABLET, FILM COATED ORAL DAILY
Qty: 90 TABLET | Refills: 0 | Status: SHIPPED | OUTPATIENT
Start: 2023-11-20 | End: 2023-11-22 | Stop reason: SDUPTHER

## 2023-11-20 RX ORDER — BUPROPION HYDROCHLORIDE 150 MG/1
150 TABLET ORAL DAILY
Qty: 90 TABLET | Refills: 0 | Status: SHIPPED | OUTPATIENT
Start: 2023-11-20 | End: 2023-11-22 | Stop reason: SDUPTHER

## 2023-11-20 RX ORDER — ATENOLOL 50 MG/1
75 TABLET ORAL DAILY
Qty: 90 TABLET | Refills: 0 | Status: SHIPPED | OUTPATIENT
Start: 2023-11-20 | End: 2023-11-22 | Stop reason: SDUPTHER

## 2023-11-22 DIAGNOSIS — K21.9 GASTROESOPHAGEAL REFLUX DISEASE WITHOUT ESOPHAGITIS: ICD-10-CM

## 2023-11-22 DIAGNOSIS — F32.5 MAJOR DEPRESSION IN COMPLETE REMISSION (HCC): ICD-10-CM

## 2023-11-22 DIAGNOSIS — G43.909 MIGRAINE WITHOUT STATUS MIGRAINOSUS, NOT INTRACTABLE, UNSPECIFIED MIGRAINE TYPE: ICD-10-CM

## 2023-11-22 DIAGNOSIS — I10 ESSENTIAL HYPERTENSION: ICD-10-CM

## 2023-11-22 DIAGNOSIS — F41.1 ANXIETY STATE: ICD-10-CM

## 2023-11-22 DIAGNOSIS — E78.2 MIXED HYPERLIPIDEMIA: ICD-10-CM

## 2023-11-22 RX ORDER — OMEPRAZOLE 40 MG/1
40 CAPSULE, DELAYED RELEASE ORAL DAILY
Qty: 90 CAPSULE | Refills: 0 | Status: SHIPPED | OUTPATIENT
Start: 2023-11-22

## 2023-11-22 RX ORDER — ATORVASTATIN CALCIUM 10 MG/1
10 TABLET, FILM COATED ORAL DAILY
Qty: 90 TABLET | Refills: 0 | Status: SHIPPED | OUTPATIENT
Start: 2023-11-22

## 2023-11-22 RX ORDER — FLUOXETINE HYDROCHLORIDE 20 MG/1
20 CAPSULE ORAL DAILY
Qty: 90 CAPSULE | Refills: 0 | Status: SHIPPED | OUTPATIENT
Start: 2023-11-22

## 2023-11-22 RX ORDER — BUPROPION HYDROCHLORIDE 150 MG/1
150 TABLET ORAL DAILY
Qty: 90 TABLET | Refills: 0 | Status: SHIPPED | OUTPATIENT
Start: 2023-11-22

## 2023-11-22 RX ORDER — ATENOLOL 50 MG/1
75 TABLET ORAL DAILY
Qty: 90 TABLET | Refills: 0 | Status: SHIPPED | OUTPATIENT
Start: 2023-11-22

## 2023-11-22 RX ORDER — TOPIRAMATE 50 MG/1
50 TABLET, FILM COATED ORAL DAILY
Qty: 90 TABLET | Refills: 0 | Status: SHIPPED | OUTPATIENT
Start: 2023-11-22

## 2023-12-07 DIAGNOSIS — E66.3 OVERWEIGHT (BMI 25.0-29.9): ICD-10-CM

## 2023-12-07 RX ORDER — PHENTERMINE HYDROCHLORIDE 37.5 MG/1
37.5 TABLET ORAL DAILY
Qty: 30 TABLET | Refills: 0 | Status: SHIPPED | OUTPATIENT
Start: 2023-12-07

## 2023-12-26 ENCOUNTER — OFFICE VISIT (OUTPATIENT)
Dept: FAMILY MEDICINE CLINIC | Facility: CLINIC | Age: 63
End: 2023-12-26
Payer: COMMERCIAL

## 2023-12-26 VITALS
RESPIRATION RATE: 18 BRPM | DIASTOLIC BLOOD PRESSURE: 102 MMHG | TEMPERATURE: 98.4 F | SYSTOLIC BLOOD PRESSURE: 148 MMHG | OXYGEN SATURATION: 97 % | HEART RATE: 83 BPM

## 2023-12-26 DIAGNOSIS — J45.21 MILD INTERMITTENT ASTHMATIC BRONCHITIS WITH ACUTE EXACERBATION: Primary | ICD-10-CM

## 2023-12-26 PROCEDURE — 99214 OFFICE O/P EST MOD 30 MIN: CPT | Performed by: FAMILY MEDICINE

## 2023-12-26 RX ORDER — PREDNISONE 10 MG/1
TABLET ORAL
Qty: 30 TABLET | Refills: 0 | Status: SHIPPED | OUTPATIENT
Start: 2023-12-26

## 2023-12-26 RX ORDER — HYDROCODONE POLISTIREX AND CHLORPHENIRAMINE POLISTIREX 10; 8 MG/5ML; MG/5ML
5 SUSPENSION, EXTENDED RELEASE ORAL EVERY 12 HOURS PRN
Qty: 120 ML | Refills: 0 | Status: SHIPPED | OUTPATIENT
Start: 2023-12-26 | End: 2024-01-04 | Stop reason: SDUPTHER

## 2023-12-26 RX ORDER — ALBUTEROL SULFATE 90 UG/1
2 AEROSOL, METERED RESPIRATORY (INHALATION) EVERY 4 HOURS PRN
Qty: 18 G | Refills: 5 | Status: SHIPPED | OUTPATIENT
Start: 2023-12-26

## 2023-12-26 NOTE — PROGRESS NOTES
Chief Complaint   Patient presents with    Cold Like Symptoms     Patient states Onset : 5 days ago        HPI   Here with URI symptoms.  Started 8 days ago with scratchy throat.  He is led to a cough about 3 days later.  Has been recently hospitalized with influenza.  She denies having fever.  Cough keeping her up at night.  She has Mucinex D and Robitussin with codeine.    Past Medical History:   Diagnosis Date    Anxiety state 1/9/2015    Esophageal reflux 4/28/2014    Essential hypertension 2/26/2016    Herpes simplex 6/12/2012    Major depression in complete remission (HCC) 02/21/2014    Migraine 3/20/2015    Mixed hyperlipidemia 6/11/2010    Obstructive sleep apnea syndrome 2/26/2016    Palpitations 3/20/2015    Personal history of colonic polyps 10/20/2020    Sensorineural hearing loss 10/30/2014    Trigeminal neuralgia of left side of face 08/25/2016    Vitamin D deficiency 4/29/2014        Past Surgical History:   Procedure Laterality Date    APPENDECTOMY      EXOSTECTOMY Right 2004    Fifth toe    LAPAROSCOPIC CHOLECYSTECTOMY  2006    LAPAROSCOPIC HYSTERECTOMY  2007    TONSILLECTOMY         Social History     Tobacco Use    Smoking status: Never    Smokeless tobacco: Never   Substance Use Topics    Alcohol use: Yes       Social History     Social History Narrative    Wth Kev since 8/13.  since 10/7/17.   He has 3 grown children.and 5 grandchildren.      Kev  retired in 4/22. 4 years older. Has some lung issues.    First  committed suicide after they were .    2 sons . 1 grandson born 6/21/20.    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          The following portions of the patient's history were reviewed and updated as appropriate: allergies, current medications, past family history, past medical history, past social history, past surgical history, and problem list.      Review of Systems       BP (!) 148/102   Pulse 83    Temp 98.4 °F (36.9 °C) (Temporal)   Resp 18   SpO2 97%      Physical Exam   Appears congested.  Both eardrums are white.  Throat shows no erythema.  Very loose cough noted.  Lungs reveal diffuse coarse expiratory wheezing.                Current Outpatient Medications:     albuterol (PROVENTIL HFA,VENTOLIN HFA) 90 mcg/act inhaler, Inhale 2 puffs every 4 (four) hours as needed for wheezing or shortness of breath, Disp: 18 g, Rfl: 5    atenolol (TENORMIN) 50 mg tablet, Take 1.5 tablets (75 mg total) by mouth daily, Disp: 90 tablet, Rfl: 0    atorvastatin (LIPITOR) 10 mg tablet, Take 1 tablet (10 mg total) by mouth daily, Disp: 90 tablet, Rfl: 0    buPROPion (WELLBUTRIN XL) 150 mg 24 hr tablet, Take 1 tablet (150 mg total) by mouth daily, Disp: 90 tablet, Rfl: 0    eletriptan (RELPAX) 40 MG tablet, Take 1 tablet (40 mg total) by mouth once as needed for migraine for up to 1 dose may repeat in 2 hours if necessary, Disp: 18 tablet, Rfl: 0    FLUoxetine (PROzac) 20 mg capsule, Take 1 capsule (20 mg total) by mouth daily, Disp: 90 capsule, Rfl: 0    guaifenesin-codeine (GUAIFENESIN AC) 100-10 MG/5ML liquid, Take 5 mL by mouth 3 (three) times a day as needed for cough, Disp: 118 mL, Rfl: 0    omeprazole (PriLOSEC) 40 MG capsule, Take 1 capsule (40 mg total) by mouth daily, Disp: 90 capsule, Rfl: 0    phentermine (ADIPEX-P) 37.5 MG tablet, Take 1 tablet (37.5 mg total) by mouth daily, Disp: 30 tablet, Rfl: 0    topiramate (TOPAMAX) 50 MG tablet, Take 1 tablet (50 mg total) by mouth daily, Disp: 90 tablet, Rfl: 0    valACYclovir (VALTREX) 1,000 mg tablet, Take 1 tablet (1,000 mg total) by mouth 2 (two) times a day for 1 day (Patient taking differently: Take 1,000 mg by mouth 2 (two) times a day If needed), Disp: 28 tablet, Rfl: 3     No problem-specific Assessment & Plan notes found for this encounter.       Diagnoses and all orders for this visit:    Mild intermittent asthmatic bronchitis with acute exacerbation    Mild  intermittent asthma with exacerbation        Patient Instructions   Has pretty significant wheezing.  Prednisone 10 mg-5, 4, 3, 2, 1-2 days each.  Proventil inhaler 2 puffs every 3-4 hours for wheezing.  Continue Mucinex D twice daily.  Tussionex 1 teaspoon twice daily for severe cough.  Suggest 2 extra strength Tylenol and 2 Advil 3 times a day for aches and pains.  Recheck 2 months for annual exam and follow-up on medical problems.

## 2023-12-26 NOTE — PATIENT INSTRUCTIONS
Has pretty significant wheezing.  Prednisone 10 mg-5, 4, 3, 2, 1-2 days each.  Proventil inhaler 2 puffs every 3-4 hours for wheezing.  Continue Mucinex D twice daily.  Tussionex 1 teaspoon twice daily for severe cough.  Suggest 2 extra strength Tylenol and 2 Advil 3 times a day for aches and pains.  Recheck 2 months for annual exam and follow-up on medical problems.

## 2024-01-04 DIAGNOSIS — J45.21 MILD INTERMITTENT ASTHMATIC BRONCHITIS WITH ACUTE EXACERBATION: ICD-10-CM

## 2024-01-04 RX ORDER — HYDROCODONE POLISTIREX AND CHLORPHENIRAMINE POLISTIREX 10; 8 MG/5ML; MG/5ML
5 SUSPENSION, EXTENDED RELEASE ORAL EVERY 12 HOURS PRN
Qty: 120 ML | Refills: 0 | Status: SHIPPED | OUTPATIENT
Start: 2024-01-04

## 2024-01-09 ENCOUNTER — OFFICE VISIT (OUTPATIENT)
Dept: FAMILY MEDICINE CLINIC | Facility: CLINIC | Age: 64
End: 2024-01-09
Payer: COMMERCIAL

## 2024-01-09 VITALS
HEIGHT: 65 IN | WEIGHT: 174 LBS | DIASTOLIC BLOOD PRESSURE: 82 MMHG | HEART RATE: 89 BPM | TEMPERATURE: 97.6 F | OXYGEN SATURATION: 98 % | BODY MASS INDEX: 28.99 KG/M2 | RESPIRATION RATE: 16 BRPM | SYSTOLIC BLOOD PRESSURE: 138 MMHG

## 2024-01-09 DIAGNOSIS — H10.33 ACUTE BACTERIAL CONJUNCTIVITIS OF BOTH EYES: ICD-10-CM

## 2024-01-09 DIAGNOSIS — J01.00 ACUTE NON-RECURRENT MAXILLARY SINUSITIS: ICD-10-CM

## 2024-01-09 DIAGNOSIS — J45.21 MILD INTERMITTENT ASTHMATIC BRONCHITIS WITH ACUTE EXACERBATION: Primary | ICD-10-CM

## 2024-01-09 PROCEDURE — 99214 OFFICE O/P EST MOD 30 MIN: CPT | Performed by: FAMILY MEDICINE

## 2024-01-09 RX ORDER — PREDNISONE 10 MG/1
TABLET ORAL
Qty: 30 TABLET | Refills: 0 | Status: SHIPPED | OUTPATIENT
Start: 2024-01-09

## 2024-01-09 RX ORDER — CODEINE PHOSPHATE/GUAIFENESIN 10-100MG/5
5 LIQUID (ML) ORAL 3 TIMES DAILY PRN
Qty: 240 ML | Refills: 0 | Status: SHIPPED | OUTPATIENT
Start: 2024-01-09

## 2024-01-09 RX ORDER — SULFACETAMIDE SODIUM 100 MG/ML
2 SOLUTION/ DROPS OPHTHALMIC
Qty: 10 ML | Refills: 0 | Status: SHIPPED | OUTPATIENT
Start: 2024-01-09 | End: 2024-01-16

## 2024-01-09 RX ORDER — AMOXICILLIN 875 MG/1
875 TABLET, COATED ORAL 2 TIMES DAILY
Qty: 14 TABLET | Refills: 0 | Status: SHIPPED | OUTPATIENT
Start: 2024-01-09 | End: 2024-01-16

## 2024-01-09 NOTE — PROGRESS NOTES
Chief Complaint   Patient presents with    Cold Like Symptoms     On going cough, wont go away.   Possible Pink eye symptoms, eyes crusted shut X 3 days in morning.         HPI   Here with a persistent cough.  Was seen on 12/26 and given a 10-day course of prednisone along with albuterol inhaler.  Not any better.  Went through 1 bottle of Tussionex.  Then if needed preauthorization.  And when 4 days ago, started with her eyes being crusted shut in the morning.  No fever.  Has been working.  Symptoms now present for 2 weeks or more.    Past Medical History:   Diagnosis Date    Anxiety state 1/9/2015    Esophageal reflux 4/28/2014    Essential hypertension 2/26/2016    Herpes simplex 6/12/2012    Major depression in complete remission (HCC) 02/21/2014    Migraine 3/20/2015    Mixed hyperlipidemia 6/11/2010    Obstructive sleep apnea syndrome 2/26/2016    Palpitations 3/20/2015    Personal history of colonic polyps 10/20/2020    Sensorineural hearing loss 10/30/2014    Trigeminal neuralgia of left side of face 08/25/2016    Vitamin D deficiency 4/29/2014        Past Surgical History:   Procedure Laterality Date    APPENDECTOMY      EXOSTECTOMY Right 2004    Fifth toe    LAPAROSCOPIC CHOLECYSTECTOMY  2006    LAPAROSCOPIC HYSTERECTOMY  2007    TONSILLECTOMY         Social History     Tobacco Use    Smoking status: Never    Smokeless tobacco: Never   Substance Use Topics    Alcohol use: Yes       Social History     Social History Narrative    Wth Kev since 8/13.  since 10/7/17.   He has 3 grown children.and 5 grandchildren.      Kev  retired in 4/22. 4 years older. Has some lung issues.    First  committed suicide after they were .    2 sons . 1 grandson born 6/21/20.    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          The following portions of the patient's history were reviewed and updated as appropriate: allergies, current medications,  "past family history, past medical history, past social history, past surgical history, and problem list.      Review of Systems       /82 (BP Location: Left arm, Patient Position: Sitting, Cuff Size: Standard)   Pulse 89   Temp 97.6 °F (36.4 °C) (Temporal)   Resp 16   Ht 5' 5\" (1.651 m)   Wt 78.9 kg (174 lb)   SpO2 98%   BMI 28.96 kg/m²      Physical Exam   Mild injection noted of the conjunctiva bilaterally.  Eardrums are white.  Mild tenderness over the maxillary sinuses.  Throat is benign.  Lungs reveal diffuse expiratory wheezing throughout.              Current Outpatient Medications:     albuterol (PROVENTIL HFA,VENTOLIN HFA) 90 mcg/act inhaler, Inhale 2 puffs every 4 (four) hours as needed for wheezing or shortness of breath, Disp: 18 g, Rfl: 5    atenolol (TENORMIN) 50 mg tablet, Take 1.5 tablets (75 mg total) by mouth daily, Disp: 90 tablet, Rfl: 0    atorvastatin (LIPITOR) 10 mg tablet, Take 1 tablet (10 mg total) by mouth daily, Disp: 90 tablet, Rfl: 0    buPROPion (WELLBUTRIN XL) 150 mg 24 hr tablet, Take 1 tablet (150 mg total) by mouth daily, Disp: 90 tablet, Rfl: 0    eletriptan (RELPAX) 40 MG tablet, Take 1 tablet (40 mg total) by mouth once as needed for migraine for up to 1 dose may repeat in 2 hours if necessary, Disp: 18 tablet, Rfl: 0    FLUoxetine (PROzac) 20 mg capsule, Take 1 capsule (20 mg total) by mouth daily, Disp: 90 capsule, Rfl: 0    omeprazole (PriLOSEC) 40 MG capsule, Take 1 capsule (40 mg total) by mouth daily, Disp: 90 capsule, Rfl: 0    phentermine (ADIPEX-P) 37.5 MG tablet, Take 1 tablet (37.5 mg total) by mouth daily, Disp: 30 tablet, Rfl: 0    topiramate (TOPAMAX) 50 MG tablet, Take 1 tablet (50 mg total) by mouth daily, Disp: 90 tablet, Rfl: 0    valACYclovir (VALTREX) 1,000 mg tablet, Take 1 tablet (1,000 mg total) by mouth 2 (two) times a day for 1 day (Patient taking differently: Take 1,000 mg by mouth 2 (two) times a day If needed), Disp: 28 tablet, Rfl: 3    " guaifenesin-codeine (GUAIFENESIN AC) 100-10 MG/5ML liquid, Take 5 mL by mouth 3 (three) times a day as needed for cough (Patient not taking: Reported on 1/9/2024), Disp: 118 mL, Rfl: 0    predniSONE 10 mg tablet, Take 5 tablets for 2 days, 4 tablets for 2 days, 3 for 2 days, 2 for 2 days, 1 for 2 days (Patient not taking: Reported on 1/9/2024), Disp: 30 tablet, Rfl: 0     No problem-specific Assessment & Plan notes found for this encounter.       Diagnoses and all orders for this visit:    Mild intermittent asthmatic bronchitis with acute exacerbation  -     predniSONE 10 mg tablet; Take 5 tablets for 2 days, 4 tablets for 2 days, 3 for 2 days, 2 for 2 days, 1 for 2 days  -     guaifenesin-codeine (GUAIFENESIN AC) 100-10 MG/5ML liquid; Take 5 mL by mouth 3 (three) times a day as needed for cough    Acute bacterial conjunctivitis of both eyes  -     sulfacetamide (BLEPH-10) 10 % ophthalmic solution; Administer 2 drops to both eyes every 3 (three) hours for 7 days    Acute non-recurrent maxillary sinusitis  -     amoxicillin (AMOXIL) 875 mg tablet; Take 1 tablet (875 mg total) by mouth 2 (two) times a day for 7 days        Patient Instructions   Still has significant wheezing.  Another course of prednisone 10 mg-5, 4, 3, 2, 1-2 days each.  Probably has a secondary sinusitis.  Amoxicillin 875 mg twice daily for 1 week.  Albuterol 2 puffs can be every 3-4 hours.  Sulfacetamide ophthalmic 2 drops each eye 6 times a day for a couple days, then 4 times a day until resolved.  Robitussin with codeine can be 1 or 2 teaspoons every 4 hours if needed for discomfort in the chest.  Suggest 2 extra strength Tylenol 3 times a day.  Follow-up if not improving.  Or return for annual physical exam.

## 2024-01-09 NOTE — PATIENT INSTRUCTIONS
Still has significant wheezing.  Another course of prednisone 10 mg-5, 4, 3, 2, 1-2 days each.  Probably has a secondary sinusitis.  Amoxicillin 875 mg twice daily for 1 week.  Albuterol 2 puffs can be every 3-4 hours.  Sulfacetamide ophthalmic 2 drops each eye 6 times a day for a couple days, then 4 times a day until resolved.  Robitussin with codeine can be 1 or 2 teaspoons every 4 hours if needed for discomfort in the chest.  Suggest 2 extra strength Tylenol 3 times a day.  Follow-up if not improving.  Or return for annual physical exam.

## 2024-01-15 DIAGNOSIS — E66.3 OVERWEIGHT (BMI 25.0-29.9): ICD-10-CM

## 2024-01-15 RX ORDER — PHENTERMINE HYDROCHLORIDE 37.5 MG/1
37.5 TABLET ORAL DAILY
Qty: 30 TABLET | Refills: 0 | Status: SHIPPED | OUTPATIENT
Start: 2024-01-15

## 2024-02-03 ENCOUNTER — PATIENT MESSAGE (OUTPATIENT)
Dept: FAMILY MEDICINE CLINIC | Facility: CLINIC | Age: 64
End: 2024-02-03

## 2024-02-03 DIAGNOSIS — R05.3 PERSISTENT COUGH FOR 3 WEEKS OR LONGER: Primary | ICD-10-CM

## 2024-02-05 DIAGNOSIS — F41.1 ANXIETY STATE: ICD-10-CM

## 2024-02-05 DIAGNOSIS — G43.909 MIGRAINE WITHOUT STATUS MIGRAINOSUS, NOT INTRACTABLE, UNSPECIFIED MIGRAINE TYPE: ICD-10-CM

## 2024-02-05 DIAGNOSIS — K21.9 GASTROESOPHAGEAL REFLUX DISEASE WITHOUT ESOPHAGITIS: ICD-10-CM

## 2024-02-05 DIAGNOSIS — I10 ESSENTIAL HYPERTENSION: ICD-10-CM

## 2024-02-05 DIAGNOSIS — F32.5 MAJOR DEPRESSION IN COMPLETE REMISSION (HCC): ICD-10-CM

## 2024-02-05 DIAGNOSIS — E78.2 MIXED HYPERLIPIDEMIA: ICD-10-CM

## 2024-02-06 ENCOUNTER — HOSPITAL ENCOUNTER (OUTPATIENT)
Dept: RADIOLOGY | Facility: HOSPITAL | Age: 64
Discharge: HOME | End: 2024-02-06
Payer: COMMERCIAL

## 2024-02-06 DIAGNOSIS — R05.3 PERSISTENT COUGH FOR 3 WEEKS OR LONGER: ICD-10-CM

## 2024-02-06 PROCEDURE — 71046 X-RAY EXAM CHEST 2 VIEWS: CPT

## 2024-02-06 RX ORDER — ATORVASTATIN CALCIUM 10 MG/1
10 TABLET, FILM COATED ORAL DAILY
Qty: 90 TABLET | Refills: 1 | Status: SHIPPED | OUTPATIENT
Start: 2024-02-06

## 2024-02-06 RX ORDER — TOPIRAMATE 50 MG/1
50 TABLET, FILM COATED ORAL DAILY
Qty: 90 TABLET | Refills: 1 | Status: SHIPPED | OUTPATIENT
Start: 2024-02-06

## 2024-02-06 RX ORDER — OMEPRAZOLE 40 MG/1
40 CAPSULE, DELAYED RELEASE ORAL DAILY
Qty: 90 CAPSULE | Refills: 1 | Status: SHIPPED | OUTPATIENT
Start: 2024-02-06

## 2024-02-06 RX ORDER — BUPROPION HYDROCHLORIDE 150 MG/1
150 TABLET ORAL DAILY
Qty: 90 TABLET | Refills: 1 | Status: SHIPPED | OUTPATIENT
Start: 2024-02-06

## 2024-02-06 RX ORDER — FLUOXETINE HYDROCHLORIDE 20 MG/1
20 CAPSULE ORAL DAILY
Qty: 90 CAPSULE | Refills: 1 | Status: SHIPPED | OUTPATIENT
Start: 2024-02-06

## 2024-02-06 RX ORDER — ATENOLOL 50 MG/1
75 TABLET ORAL DAILY
Qty: 90 TABLET | Refills: 1 | Status: SHIPPED | OUTPATIENT
Start: 2024-02-06 | End: 2024-02-12 | Stop reason: SDUPTHER

## 2024-02-12 ENCOUNTER — OFFICE VISIT (OUTPATIENT)
Dept: FAMILY MEDICINE CLINIC | Facility: CLINIC | Age: 64
End: 2024-02-12
Payer: COMMERCIAL

## 2024-02-12 VITALS
SYSTOLIC BLOOD PRESSURE: 110 MMHG | OXYGEN SATURATION: 94 % | HEART RATE: 74 BPM | BODY MASS INDEX: 29.32 KG/M2 | DIASTOLIC BLOOD PRESSURE: 68 MMHG | TEMPERATURE: 96 F | WEIGHT: 176 LBS | HEIGHT: 65 IN

## 2024-02-12 DIAGNOSIS — J45.41 MODERATE PERSISTENT ASTHMA WITH ACUTE EXACERBATION: Primary | ICD-10-CM

## 2024-02-12 DIAGNOSIS — I10 ESSENTIAL HYPERTENSION: ICD-10-CM

## 2024-02-12 PROCEDURE — 99214 OFFICE O/P EST MOD 30 MIN: CPT | Performed by: FAMILY MEDICINE

## 2024-02-12 RX ORDER — CODEINE PHOSPHATE/GUAIFENESIN 10-100MG/5
5 LIQUID (ML) ORAL 3 TIMES DAILY PRN
Qty: 240 ML | Refills: 0 | Status: SHIPPED | OUTPATIENT
Start: 2024-02-12

## 2024-02-12 RX ORDER — PREDNISONE 10 MG/1
TABLET ORAL
Qty: 45 TABLET | Refills: 0 | Status: SHIPPED | OUTPATIENT
Start: 2024-02-12

## 2024-02-12 RX ORDER — IPRATROPIUM BROMIDE AND ALBUTEROL SULFATE 2.5; .5 MG/3ML; MG/3ML
3 SOLUTION RESPIRATORY (INHALATION) 4 TIMES DAILY
Qty: 60 ML | Refills: 4 | Status: SHIPPED | OUTPATIENT
Start: 2024-02-12

## 2024-02-12 RX ORDER — ATENOLOL 50 MG/1
50 TABLET ORAL DAILY
Start: 2024-02-12

## 2024-02-12 NOTE — PROGRESS NOTES
Chief Complaint   Patient presents with    Follow-up     Ccough still ongoing        HPI   Here with a cough which is now present for 2 months..  Deep harsh cough.  Recent x-ray was unremarkable.  Has now been through 2 bouts of prednisone.  First on 12/26 and then again on 1/9.  10-day course.  Continues on her albuterol every 4 hours.  Continues on 50 mg of atenolol.  If she does not take it, she gets somewhat shaky and jittery.    Past Medical History:   Diagnosis Date    Anxiety state 1/9/2015    Esophageal reflux 4/28/2014    Essential hypertension 2/26/2016    Herpes simplex 6/12/2012    Major depression in complete remission (HCC) 02/21/2014    Migraine 3/20/2015    Mixed hyperlipidemia 6/11/2010    Obstructive sleep apnea syndrome 2/26/2016    Palpitations 3/20/2015    Personal history of colonic polyps 10/20/2020    Sensorineural hearing loss 10/30/2014    Trigeminal neuralgia of left side of face 08/25/2016    Vitamin D deficiency 4/29/2014        Past Surgical History:   Procedure Laterality Date    APPENDECTOMY      EXOSTECTOMY Right 2004    Fifth toe    LAPAROSCOPIC CHOLECYSTECTOMY  2006    LAPAROSCOPIC HYSTERECTOMY  2007    TONSILLECTOMY         Social History     Tobacco Use    Smoking status: Never    Smokeless tobacco: Never   Substance Use Topics    Alcohol use: Yes       Social History     Social History Narrative    Wth Kev since 8/13.  since 10/7/17.   He has 3 grown children.and 5 grandchildren.      Kev  retired in 4/22. 4 years older. Has some lung issues.    First  committed suicide after they were .    2 sons . 1 grandson born 6/21/20.    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    [employer] in [place].          The following portions of the patient's history were reviewed and updated as appropriate: allergies, current medications, past family history, past medical history, past social history, past surgical history, and problem  "list.      Review of Systems       /68 (BP Location: Left arm, Patient Position: Sitting, Cuff Size: Large)   Pulse 74   Temp (!) 96 °F (35.6 °C) (Temporal)   Ht 5' 5\" (1.651 m)   Wt 79.8 kg (176 lb)   SpO2 94%   BMI 29.29 kg/m²      Physical Exam   Harsh cough noted.  Lungs reveal diffuse expiratory wheezing throughout.  Very coarse.              Current Outpatient Medications:     albuterol (PROVENTIL HFA,VENTOLIN HFA) 90 mcg/act inhaler, Inhale 2 puffs every 4 (four) hours as needed for wheezing or shortness of breath, Disp: 18 g, Rfl: 5    atenolol (TENORMIN) 50 mg tablet, Take 1 tablet (50 mg total) by mouth daily, Disp: , Rfl:     atorvastatin (LIPITOR) 10 mg tablet, Take 1 tablet (10 mg total) by mouth daily, Disp: 90 tablet, Rfl: 1    buPROPion (WELLBUTRIN XL) 150 mg 24 hr tablet, Take 1 tablet (150 mg total) by mouth daily, Disp: 90 tablet, Rfl: 1    eletriptan (RELPAX) 40 MG tablet, Take 1 tablet (40 mg total) by mouth once as needed for migraine for up to 1 dose may repeat in 2 hours if necessary, Disp: 18 tablet, Rfl: 0    FLUoxetine (PROzac) 20 mg capsule, Take 1 capsule (20 mg total) by mouth daily, Disp: 90 capsule, Rfl: 1    guaifenesin-codeine (GUAIFENESIN AC) 100-10 MG/5ML liquid, Take 5 mL by mouth 3 (three) times a day as needed for cough, Disp: 240 mL, Rfl: 0    ipratropium-albuterol (DUO-NEB) 0.5-2.5 mg/3 mL nebulizer solution, Take 3 mL by nebulization 4 (four) times a day, Disp: 60 mL, Rfl: 4    omeprazole (PriLOSEC) 40 MG capsule, Take 1 capsule (40 mg total) by mouth daily, Disp: 90 capsule, Rfl: 1    phentermine (ADIPEX-P) 37.5 MG tablet, Take 1 tablet (37.5 mg total) by mouth daily, Disp: 30 tablet, Rfl: 0    predniSONE 10 mg tablet, Take 5 tablets for 3 days, 4 tablets for 3 days, 3 for 3 days, 2 for 3 days, 1 for 3 days, Disp: 45 tablet, Rfl: 0    topiramate (TOPAMAX) 50 MG tablet, Take 1 tablet (50 mg total) by mouth daily, Disp: 90 tablet, Rfl: 1    valACYclovir (VALTREX) " 1,000 mg tablet, Take 1 tablet (1,000 mg total) by mouth 2 (two) times a day for 1 day (Patient taking differently: Take 1,000 mg by mouth 2 (two) times a day If needed), Disp: 28 tablet, Rfl: 3     No problem-specific Assessment & Plan notes found for this encounter.       Diagnoses and all orders for this visit:    Moderate persistent asthma with acute exacerbation  -     predniSONE 10 mg tablet; Take 5 tablets for 3 days, 4 tablets for 3 days, 3 for 3 days, 2 for 3 days, 1 for 3 days  -     Nebulizer  -     ipratropium-albuterol (DUO-NEB) 0.5-2.5 mg/3 mL nebulizer solution; Take 3 mL by nebulization 4 (four) times a day  -     guaifenesin-codeine (GUAIFENESIN AC) 100-10 MG/5ML liquid; Take 5 mL by mouth 3 (three) times a day as needed for cough    Essential hypertension  -     atenolol (TENORMIN) 50 mg tablet; Take 1 tablet (50 mg total) by mouth daily        Patient Instructions   Prednisone 50, 40, 30, 20, 10-3 days each.  DuoNeb via nebulizer 4 times a day.  In between, okay to use her albuterol inhaler.  Refill for Robitussin with codeine to use every 4 hours as needed.  Recheck in 2 weeks.

## 2024-02-12 NOTE — PATIENT INSTRUCTIONS
Prednisone 50, 40, 30, 20, 10-3 days each.  DuoNeb via nebulizer 4 times a day.  In between, okay to use her albuterol inhaler.  Refill for Robitussin with codeine to use every 4 hours as needed.  Recheck in 2 weeks.

## 2024-02-22 ENCOUNTER — APPOINTMENT (OUTPATIENT)
Dept: LAB | Facility: CLINIC | Age: 64
End: 2024-02-22
Payer: COMMERCIAL

## 2024-02-22 ENCOUNTER — OFFICE VISIT (OUTPATIENT)
Dept: PULMONOLOGY | Facility: CLINIC | Age: 64
End: 2024-02-22
Payer: COMMERCIAL

## 2024-02-22 VITALS
TEMPERATURE: 97.2 F | OXYGEN SATURATION: 97 % | DIASTOLIC BLOOD PRESSURE: 62 MMHG | HEART RATE: 72 BPM | SYSTOLIC BLOOD PRESSURE: 116 MMHG

## 2024-02-22 DIAGNOSIS — J45.21 INTERMITTENT ASTHMA WITH ACUTE EXACERBATION, UNSPECIFIED ASTHMA SEVERITY: Primary | ICD-10-CM

## 2024-02-22 DIAGNOSIS — J45.21 INTERMITTENT ASTHMA WITH ACUTE EXACERBATION, UNSPECIFIED ASTHMA SEVERITY: ICD-10-CM

## 2024-02-22 DIAGNOSIS — J45.41 MODERATE PERSISTENT ASTHMA WITH ACUTE EXACERBATION: ICD-10-CM

## 2024-02-22 PROCEDURE — 95012 NITRIC OXIDE EXP GAS DETER: CPT | Performed by: INTERNAL MEDICINE

## 2024-02-22 PROCEDURE — 86003 ALLG SPEC IGE CRUDE XTRC EA: CPT

## 2024-02-22 PROCEDURE — 36415 COLL VENOUS BLD VENIPUNCTURE: CPT

## 2024-02-22 PROCEDURE — 82785 ASSAY OF IGE: CPT

## 2024-02-22 PROCEDURE — 99205 OFFICE O/P NEW HI 60 MIN: CPT | Performed by: INTERNAL MEDICINE

## 2024-02-22 RX ORDER — AZITHROMYCIN 250 MG/1
TABLET, FILM COATED ORAL
Qty: 6 TABLET | Refills: 0 | Status: SHIPPED | OUTPATIENT
Start: 2024-02-22 | End: 2024-02-26

## 2024-02-22 RX ORDER — BUDESONIDE AND FORMOTEROL FUMARATE DIHYDRATE 160; 4.5 UG/1; UG/1
2 AEROSOL RESPIRATORY (INHALATION) 2 TIMES DAILY
Qty: 10.2 G | Refills: 3 | Status: SHIPPED | OUTPATIENT
Start: 2024-02-22

## 2024-02-22 RX ORDER — ALBUTEROL SULFATE 2.5 MG/3ML
2.5 SOLUTION RESPIRATORY (INHALATION) ONCE
Status: DISCONTINUED | OUTPATIENT
Start: 2024-02-22 | End: 2024-02-22

## 2024-02-22 RX ORDER — PREDNISONE 10 MG/1
TABLET ORAL
Qty: 30 TABLET | Refills: 0 | Status: SHIPPED | OUTPATIENT
Start: 2024-02-22

## 2024-02-22 NOTE — ASSESSMENT & PLAN NOTE
Joe and I discussed her new diagnosis of asthmatic bronchitis.  She now has a persistent asthma exacerbation with increased airway inflammation as evidenced by her FeNO of 38.  Given her sinus pain and congestion and concern for sinusitis as well given her Zithromax for 5 days restarted a prednisone taper.  I stressed the importance of long-acting maintenance therapy for the next few months.  She will start on Symbicort 160, 2 puffs twice a day and have given her a nebulizer with supplies instructed her to use her nebulizer 3 times a day at home.  She will maintain on therapy and I will follow-up with her in 1 month to assure resolution of symptoms.  Her chest x-ray was normal.

## 2024-02-22 NOTE — PROGRESS NOTES
Assessment/Plan:    Moderate persistent asthma with acute exacerbation  Joe and I discussed her new diagnosis of asthmatic bronchitis.  She now has a persistent asthma exacerbation with increased airway inflammation as evidenced by her FeNO of 38.  Given her sinus pain and congestion and concern for sinusitis as well given her Zithromax for 5 days restarted a prednisone taper.  I stressed the importance of long-acting maintenance therapy for the next few months.  She will start on Symbicort 160, 2 puffs twice a day and have given her a nebulizer with supplies instructed her to use her nebulizer 3 times a day at home.  She will maintain on therapy and I will follow-up with her in 1 month to assure resolution of symptoms.  Her chest x-ray was normal.     Diagnoses and all orders for this visit:    Intermittent asthma with acute exacerbation, unspecified asthma severity  -     POCT FeNO  -     budesonide-formoterol (Symbicort) 160-4.5 mcg/act inhaler; Inhale 2 puffs 2 (two) times a day Rinse mouth after use.  -     Nebulizer Supplies  -     Nebulizer  -     predniSONE 10 mg tablet; Take 4 a day for 3 days then 3 a day for 3 days then 2 a day for 3 days then 2 a day for 3 days then 1 a day for 3 days  -     [facility] Allergy Panel, Adult; Future  -     Food Allergy Profile; Future  -     azithromycin (ZITHROMAX) 250 mg tablet; Take 2 tablets today then 1 tablet daily x 4 days  -     albuterol inhalation solution 2.5 mg    Moderate persistent asthma with acute exacerbation          Subjective:      Patient ID: Barry Travis is a 63 y.o. female.    Barry is here for evaluation of her breathing.  About 2-1/2 months ago she had an episode of an upper respiratory infection and bronchitis which was treated with albuterol and prednisone.  Since that time she has had cough productive of white mucus all day and all night with subsequent muscle pain in between her ribs she has had nasal congestion postnasal drip and how has  yellow sinus drainage.  She has wheezing no significant shortness of breath and her cough is nonstop day and night and interrupts her sleep.  She is never had episodes of bronchitis or pneumonia before is a lifelong non-smoker and works at [employer].  She is a dog at home which is not new and a pellet stove for the past 3 years.        The following portions of the patient's history were reviewed and updated as appropriate: allergies, current medications, past family history, past medical history, past social history, past surgical history, and problem list.    Review of Systems   Constitutional: Negative.  Negative for unexpected weight change.   HENT: Negative.  Negative for postnasal drip.    Eyes: Negative.    Respiratory: Negative.  Negative for cough, shortness of breath and wheezing.    Cardiovascular: Negative.  Negative for chest pain and leg swelling.   Gastrointestinal: Negative.    Endocrine: Negative.    Genitourinary: Negative.    Musculoskeletal: Negative.    Allergic/Immunologic: Negative.    Neurological: Negative.    Hematological: Negative.          Objective:      /62 (BP Location: Right arm, Patient Position: Sitting, Cuff Size: Standard)   Pulse 72   Temp (!) 97.2 °F (36.2 °C) (Tympanic)   SpO2 97%          Physical Exam  Constitutional:       Appearance: She is well-developed.   HENT:      Head: Normocephalic.   Eyes:      Pupils: Pupils are equal, round, and reactive to light.   Cardiovascular:      Rate and Rhythm: Normal rate.      Heart sounds: No murmur heard.  Pulmonary:      Effort: Pulmonary effort is normal. No respiratory distress.      Breath sounds: Normal breath sounds. No wheezing or rales.   Abdominal:      Palpations: Abdomen is soft.   Musculoskeletal:         General: Normal range of motion.      Cervical back: Normal range of motion and neck supple.   Skin:     General: Skin is warm and dry.   Neurological:      Mental Status: She is alert and oriented to person,  place, and time.

## 2024-02-26 LAB
A ALTERNATA IGE QN: <0.1 KUA/I
A FUMIGATUS IGE QN: <0.1 KUA/I
ALMOND IGE QN: <0.1 KUA/I
BERMUDA GRASS IGE QN: <0.1 KUA/I
BOXELDER IGE QN: <0.1 KUA/I
C HERBARUM IGE QN: <0.1 KUA/I
CASHEW NUT IGE QN: <0.1 KUA/I
CAT DANDER IGE QN: <0.1 KUA/I
CMN PIGWEED IGE QN: <0.1 KUA/I
CODFISH IGE QN: <0.1 KUA/I
COMMON RAGWEED IGE QN: <0.1 KUA/I
COTTONWOOD IGE QN: <0.1 KUA/I
D FARINAE IGE QN: <0.1 KUA/I
D PTERONYSS IGE QN: <0.1 KUA/I
DOG DANDER IGE QN: <0.1 KUA/I
EGG WHITE IGE QN: <0.1 KUA/I
GLUTEN IGE QN: <0.1 KUA/I
HAZELNUT IGE QN: <0.1 KUA/L
LONDON PLANE IGE QN: <0.1 KUA/I
MILK IGE QN: <0.1 KUA/I
MOUSE URINE PROT IGE QN: <0.1 KUA/I
MT JUNIPER IGE QN: <0.1 KUA/I
MUGWORT IGE QN: <0.1 KUA/I
P NOTATUM IGE QN: <0.1 KUA/I
PEANUT IGE QN: <0.1 KUA/I
ROACH IGE QN: <0.1 KUA/I
SALMON IGE QN: <0.1 KUA/I
SCALLOP IGE QN: <0.1 KUA/L
SESAME SEED IGE QN: <0.1 KUA/I
SHEEP SORREL IGE QN: <0.1 KUA/I
SHRIMP IGE QN: <0.1 KUA/L
SILVER BIRCH IGE QN: <0.1 KUA/I
SOYBEAN IGE QN: <0.1 KUA/I
TIMOTHY IGE QN: <0.1 KUA/I
TOTAL IGE SMQN RAST: 22.5 KU/L (ref 0–113)
TOTAL IGE SMQN RAST: 23.1 KU/L (ref 0–113)
TUNA IGE QN: <0.1 KUA/I
WALNUT IGE QN: <0.1 KUA/I
WALNUT IGE QN: <0.1 KUA/I
WHEAT IGE QN: <0.1 KUA/I
WHITE ASH IGE QN: <0.1 KUA/I
WHITE ELM IGE QN: <0.1 KUA/I
WHITE MULBERRY IGE QN: <0.1 KUA/I
WHITE OAK IGE QN: <0.1 KUA/I

## 2024-02-27 ENCOUNTER — TELEPHONE (OUTPATIENT)
Dept: FAMILY MEDICINE CLINIC | Facility: CLINIC | Age: 64
End: 2024-02-27

## 2024-03-14 ENCOUNTER — TELEPHONE (OUTPATIENT)
Age: 64
End: 2024-03-14

## 2024-03-14 DIAGNOSIS — J45.41 MODERATE PERSISTENT ASTHMA WITH ACUTE EXACERBATION: ICD-10-CM

## 2024-03-14 RX ORDER — PREDNISONE 10 MG/1
TABLET ORAL
Qty: 45 TABLET | Refills: 0 | Status: SHIPPED | OUTPATIENT
Start: 2024-03-14

## 2024-03-14 NOTE — TELEPHONE ENCOUNTER
Incoming call:    Re: Pt of Dr. Segal     Pt out of prednisone - pt symptoms returning after she stopped. Is she able to have another script?

## 2024-03-18 ENCOUNTER — TELEPHONE (OUTPATIENT)
Dept: PULMONOLOGY | Facility: CLINIC | Age: 64
End: 2024-03-18

## 2024-03-18 ENCOUNTER — HOSPITAL ENCOUNTER (OUTPATIENT)
Dept: RADIOLOGY | Facility: HOSPITAL | Age: 64
Discharge: HOME | End: 2024-03-18
Attending: INTERNAL MEDICINE
Payer: COMMERCIAL

## 2024-03-18 ENCOUNTER — APPOINTMENT (OUTPATIENT)
Dept: LAB | Facility: HOSPITAL | Age: 64
End: 2024-03-18
Payer: COMMERCIAL

## 2024-03-18 ENCOUNTER — NURSE TRIAGE (OUTPATIENT)
Age: 64
End: 2024-03-18

## 2024-03-18 ENCOUNTER — OFFICE VISIT (OUTPATIENT)
Dept: PULMONOLOGY | Facility: CLINIC | Age: 64
End: 2024-03-18
Payer: COMMERCIAL

## 2024-03-18 ENCOUNTER — TELEPHONE (OUTPATIENT)
Age: 64
End: 2024-03-18

## 2024-03-18 VITALS
TEMPERATURE: 97 F | WEIGHT: 172 LBS | DIASTOLIC BLOOD PRESSURE: 66 MMHG | HEART RATE: 77 BPM | HEIGHT: 65 IN | BODY MASS INDEX: 28.66 KG/M2 | OXYGEN SATURATION: 96 % | SYSTOLIC BLOOD PRESSURE: 112 MMHG

## 2024-03-18 DIAGNOSIS — J40 BRONCHITIS: ICD-10-CM

## 2024-03-18 DIAGNOSIS — R05.2 SUBACUTE COUGH: ICD-10-CM

## 2024-03-18 DIAGNOSIS — R05.2 SUBACUTE COUGH: Primary | ICD-10-CM

## 2024-03-18 LAB
ANION GAP SERPL CALCULATED.3IONS-SCNC: 9 MMOL/L (ref 4–13)
BASOPHILS # BLD AUTO: 0.04 THOUSANDS/ÂΜL (ref 0–0.1)
BASOPHILS NFR BLD AUTO: 0 % (ref 0–1)
BUN SERPL-MCNC: 19 MG/DL (ref 5–25)
CALCIUM SERPL-MCNC: 9.8 MG/DL (ref 8.4–10.2)
CHLORIDE SERPL-SCNC: 102 MMOL/L (ref 96–108)
CO2 SERPL-SCNC: 25 MMOL/L (ref 21–32)
CREAT SERPL-MCNC: 1.04 MG/DL (ref 0.6–1.3)
EOSINOPHIL # BLD AUTO: 0.08 THOUSAND/ÂΜL (ref 0–0.61)
EOSINOPHIL NFR BLD AUTO: 1 % (ref 0–6)
ERYTHROCYTE [DISTWIDTH] IN BLOOD BY AUTOMATED COUNT: 13.1 % (ref 11.6–15.1)
GFR SERPL CREATININE-BSD FRML MDRD: 57 ML/MIN/1.73SQ M
GLUCOSE P FAST SERPL-MCNC: 96 MG/DL (ref 65–99)
HCT VFR BLD AUTO: 41.8 % (ref 34.8–46.1)
HGB BLD-MCNC: 14 G/DL (ref 11.5–15.4)
IMM GRANULOCYTES # BLD AUTO: 0.05 THOUSAND/UL (ref 0–0.2)
IMM GRANULOCYTES NFR BLD AUTO: 0 % (ref 0–2)
LYMPHOCYTES # BLD AUTO: 4.26 THOUSANDS/ÂΜL (ref 0.6–4.47)
LYMPHOCYTES NFR BLD AUTO: 36 % (ref 14–44)
MCH RBC QN AUTO: 30.9 PG (ref 26.8–34.3)
MCHC RBC AUTO-ENTMCNC: 33.5 G/DL (ref 31.4–37.4)
MCV RBC AUTO: 92 FL (ref 82–98)
MONOCYTES # BLD AUTO: 0.93 THOUSAND/ÂΜL (ref 0.17–1.22)
MONOCYTES NFR BLD AUTO: 8 % (ref 4–12)
NEUTROPHILS # BLD AUTO: 6.56 THOUSANDS/ÂΜL (ref 1.85–7.62)
NEUTS SEG NFR BLD AUTO: 55 % (ref 43–75)
NRBC BLD AUTO-RTO: 0 /100 WBCS
PLATELET # BLD AUTO: 261 THOUSANDS/UL (ref 149–390)
PMV BLD AUTO: 9.6 FL (ref 8.9–12.7)
POTASSIUM SERPL-SCNC: 3.7 MMOL/L (ref 3.5–5.3)
PROCALCITONIN SERPL-MCNC: 0.1 NG/ML
RBC # BLD AUTO: 4.53 MILLION/UL (ref 3.81–5.12)
SODIUM SERPL-SCNC: 136 MMOL/L (ref 135–147)
WBC # BLD AUTO: 11.92 THOUSAND/UL (ref 4.31–10.16)

## 2024-03-18 PROCEDURE — 87636 SARSCOV2 & INF A&B AMP PRB: CPT | Performed by: INTERNAL MEDICINE

## 2024-03-18 PROCEDURE — 84145 PROCALCITONIN (PCT): CPT

## 2024-03-18 PROCEDURE — 99214 OFFICE O/P EST MOD 30 MIN: CPT | Performed by: INTERNAL MEDICINE

## 2024-03-18 PROCEDURE — 80048 BASIC METABOLIC PNL TOTAL CA: CPT

## 2024-03-18 PROCEDURE — 71046 X-RAY EXAM CHEST 2 VIEWS: CPT

## 2024-03-18 PROCEDURE — 85025 COMPLETE CBC W/AUTO DIFF WBC: CPT

## 2024-03-18 PROCEDURE — 36415 COLL VENOUS BLD VENIPUNCTURE: CPT

## 2024-03-18 RX ORDER — AMOXICILLIN AND CLAVULANATE POTASSIUM 875; 125 MG/1; MG/1
1 TABLET, FILM COATED ORAL EVERY 12 HOURS SCHEDULED
Qty: 20 TABLET | Refills: 0 | Status: SHIPPED | OUTPATIENT
Start: 2024-03-18 | End: 2024-03-28

## 2024-03-18 NOTE — TELEPHONE ENCOUNTER
Tried calling the patient about the x-ray but it went to voicemail.  I sent the patient a [employer] message.  I would continue antibiotics as prescribed in my office visit.    The patient will follow-up in 4 weeks in the office.  However, if she does not feel better within 1 to 2 weeks, she should let us know.  This abnormality in the lingula would warrant a CT chest without contrast for further delineation if her symptoms do not improve.

## 2024-03-18 NOTE — ASSESSMENT & PLAN NOTE
Persistent cough since November 2022.  Treated in February with Symbicort and albuterol, Z-Jonah, steroid taper.  Initially had some improvement but over past few weeks have been worsening.  Now associated with fever, yellow mucus production, nasal congestion, night sweats/chills, malaise    Concerning for bacterial tracheobronchitis/pneumonia  -Recommend Augmentin course, minimum 7 days; I prescribed a 10-day course, she can stop at 7 days if symptoms are improved  -No facial pain/symptoms with palpation to suggest acute sinusitis, but low threshold for further evaluation with CT sinus  -Repeat chest x-ray PA lateral  -CBC/BMP/procalcitonin  -Flu/COVID swab  -Sputum culture  -I will call her if any of these results are abnormal.  Otherwise we will send MyChart message  -Follow-up in the office in 4 weeks

## 2024-03-18 NOTE — PROGRESS NOTES
Pulmonary Outpatient Follow Up Note   Barry Travis 64 y.o. female MRN: [id number]  3/18/2024    Reason for Consultation:    Chief Complaint   Patient presents with    Cough     Assessment/Plan:    1. Subacute cough  Assessment & Plan:  Persistent cough since November 2022.  Treated in February with Symbicort and albuterol, Z-Jonah, steroid taper.  Initially had some improvement but over past few weeks have been worsening.  Now associated with fever, yellow mucus production, nasal congestion, night sweats/chills, malaise    Concerning for bacterial tracheobronchitis/pneumonia  -Recommend Augmentin course, minimum 7 days; I prescribed a 10-day course, she can stop at 7 days if symptoms are improved  -No facial pain/symptoms with palpation to suggest acute sinusitis, but low threshold for further evaluation with CT sinus  -Repeat chest x-ray PA lateral  -CBC/BMP/procalcitonin  -Flu/COVID swab  -Sputum culture  -I will call her if any of these results are abnormal.  Otherwise we will send [employer] message  -Follow-up in the office in 4 weeks    Orders:  -     XR chest pa & lateral; Future; Expected date: 03/18/2024  -     CBC and differential; Future  -     Procalcitonin; Future  -     Basic metabolic panel; Future  -     Sputum culture and Gram stain; Future  -     Covid/Flu- Lab Collect    2. Bronchitis  -     amoxicillin-clavulanate (AUGMENTIN) 875-125 mg per tablet; Take 1 tablet by mouth every 12 (twelve) hours for 10 days        Health Maintenance  Immunization History   Administered Date(s) Administered    COVID-19 PFIZER VACCINE 0.3 ML IM 11/22/2021, 12/30/2021    INFLUENZA 10/04/2016, 11/17/2017, 10/02/2019, 12/12/2022    Influenza, recombinant, quadrivalent,injectable, preservative free 09/29/2020, 12/12/2022    Tdap 04/28/2014    Tuberculin Skin Test-PPD Intradermal 05/12/2015, 10/02/2019    Zoster Vaccine Recombinant 10/20/2020        Return in about 4 weeks (around 4/15/2024).    History of Present Illness    HPI:  Barry Travis is a pleasant 64 y.o. female with a history of anxiety, hypertension, migraines, hyperlipidemia, who is presenting for reevaluation of subacute cough, sputum production, now with fever    3/18/24 - FU with me -early return to clinic due to worsening symptoms.  Using Symbicort regularly.  Albuterol nebulizer four times a day.  Flonase benefits somewhat.  Since last visit, she had some benefit with steroids but over past weeks has been worsening with more cough, sputum production.  She had a fever of 102 overnight.  She has been having night sweats/chills.  Appetite is decreased.  No nausea vomiting or urinary symptoms.  She has now nasal congestion, but no facial pain to palpation.      3/14/24 -telephone encounter -patient symptoms returning, and another prednisone taper ordered.    2/22/24 - Consult w/ Dr Segal - About 2-1/2 months ago she had an episode of an upper respiratory infection and bronchitis which was treated with albuterol and prednisone. Since that time she has had cough productive of white mucus all day and all night with subsequent muscle pain in between her ribs she has had nasal congestion postnasal drip and how has yellow sinus drainage. She has wheezing no significant shortness of breath and her cough is nonstop day and night and interrupts her sleep. She is never had episodes of bronchitis or pneumonia before is a lifelong non-smoker and works at [employer]. She is a dog at home which is not new and a pellet stove for the past 3 years.     FeNO 38, prescribed Symbicort, albuterol with nebulizers, 12-day steroid taper, Z-Jonah.      Meds/Allergies     Current Outpatient Medications:     albuterol (PROVENTIL HFA,VENTOLIN HFA) 90 mcg/act inhaler, Inhale 2 puffs every 4 (four) hours as needed for wheezing or shortness of breath, Disp: 18 g, Rfl: 5    amoxicillin-clavulanate (AUGMENTIN) 875-125 mg per tablet, Take 1 tablet by mouth every 12 (twelve) hours for 10 days, Disp: 20  tablet, Rfl: 0    atenolol (TENORMIN) 50 mg tablet, Take 1 tablet (50 mg total) by mouth daily, Disp: , Rfl:     atorvastatin (LIPITOR) 10 mg tablet, Take 1 tablet (10 mg total) by mouth daily, Disp: 90 tablet, Rfl: 1    budesonide-formoterol (Symbicort) 160-4.5 mcg/act inhaler, Inhale 2 puffs 2 (two) times a day Rinse mouth after use., Disp: 10.2 g, Rfl: 3    buPROPion (WELLBUTRIN XL) 150 mg 24 hr tablet, Take 1 tablet (150 mg total) by mouth daily, Disp: 90 tablet, Rfl: 1    eletriptan (RELPAX) 40 MG tablet, Take 1 tablet (40 mg total) by mouth once as needed for migraine for up to 1 dose may repeat in 2 hours if necessary, Disp: 18 tablet, Rfl: 0    FLUoxetine (PROzac) 20 mg capsule, Take 1 capsule (20 mg total) by mouth daily, Disp: 90 capsule, Rfl: 1    ipratropium-albuterol (DUO-NEB) 0.5-2.5 mg/3 mL nebulizer solution, Take 3 mL by nebulization 4 (four) times a day, Disp: 60 mL, Rfl: 4    omeprazole (PriLOSEC) 40 MG capsule, Take 1 capsule (40 mg total) by mouth daily, Disp: 90 capsule, Rfl: 1    predniSONE 10 mg tablet, Take 4 a day for 3 days then 3 a day for 3 days then 2 a day for 3 days then 2 a day for 3 days then 1 a day for 3 days, Disp: 30 tablet, Rfl: 0    predniSONE 10 mg tablet, Take 5 tablets for 3 days, 4 tablets for 3 days, 3 for 3 days, 2 for 3 days, 1 for 3 days, Disp: 45 tablet, Rfl: 0    topiramate (TOPAMAX) 50 MG tablet, Take 1 tablet (50 mg total) by mouth daily, Disp: 90 tablet, Rfl: 1    valACYclovir (VALTREX) 1,000 mg tablet, Take 1 tablet (1,000 mg total) by mouth 2 (two) times a day for 1 day (Patient taking differently: Take 1,000 mg by mouth 2 (two) times a day If needed), Disp: 28 tablet, Rfl: 3    guaifenesin-codeine (GUAIFENESIN AC) 100-10 MG/5ML liquid, Take 5 mL by mouth 3 (three) times a day as needed for cough (Patient not taking: Reported on 3/18/2024), Disp: 240 mL, Rfl: 0  No Known Allergies    Vitals: Blood pressure 112/66, pulse 77, temperature (!) 97 °F (36.1 °C),  "temperature source Tympanic, height 5' 5\" (1.651 m), weight 78 kg (172 lb), SpO2 96%. Body mass index is 28.62 kg/m². Oxygen Therapy  SpO2: 96 %  Oxygen Therapy: None (Room air)    Physical Exam  Vitals and nursing note reviewed.   Constitutional:       General: She is not in acute distress.     Appearance: She is well-developed. She is not ill-appearing, toxic-appearing or diaphoretic.   HENT:      Head: Normocephalic and atraumatic.      Comments: No facial pain to palpation at the maxillary and frontal sinuses     Nose: Congestion and rhinorrhea present.      Mouth/Throat:      Mouth: Mucous membranes are moist.      Pharynx: Oropharynx is clear. No oropharyngeal exudate.   Eyes:      General: No scleral icterus.     Extraocular Movements: Extraocular movements intact.      Conjunctiva/sclera: Conjunctivae normal.   Cardiovascular:      Rate and Rhythm: Normal rate and regular rhythm.   Pulmonary:      Effort: Pulmonary effort is normal. No respiratory distress.      Breath sounds: No stridor. Wheezing and rhonchi present.   Abdominal:      Tenderness: There is no guarding.   Musculoskeletal:         General: No swelling.      Cervical back: Normal range of motion and neck supple. No rigidity.      Right lower leg: No edema.      Left lower leg: No edema.   Skin:     General: Skin is warm and dry.      Coloration: Skin is not jaundiced.   Neurological:      General: No focal deficit present.      Mental Status: She is alert and oriented to person, place, and time. Mental status is at baseline.   Psychiatric:         Mood and Affect: Mood normal.         Labs:   I have personally reviewed pertinent lab results.    ABG: No results found for: \"PHART\", \"PTC9PRB\", \"PO2ART\", \"FUA1GSJ\", \"B9GPFGFI\", \"BEART\", \"SOURCE\",   CBC:No results found for: \"WBC\", \"HGB\", \"HCT\", \"MCV\", \"PLT\", \"ADJUSTEDWBC\", \"EOSPCT\", \"EOSABS\", \"NEUTOPHILPCT\", \"LYMPHOPCT\",   CMP:   Lab Results   Component Value Date    SODIUM 140 10/05/2023    K 4.4 " "10/05/2023     10/05/2023    CO2 24 10/05/2023    BUN 19 10/05/2023    CREATININE 0.90 10/05/2023    CALCIUM 9.5 10/05/2023    AST 21 2018    ALT 44 2018    ALKPHOS 69 2018    EGFR 69 10/05/2023   ,   PT/INR: No results found for: \"PT\", \"INR\",       Imaging and other studies: I have personally reviewed pertinent reports.   and I have personally reviewed pertinent films in PACS  2024-no significant pulmonary infiltrates.  Cardiac silhouette normal.    Pulmonary function testin24 - FeNO 38    Transthoracic Echo:    Sleep Study:      Vaibhav Pyle MD  Pulmonary, Critical Care and Sleep Medicine  [facility]     "

## 2024-03-18 NOTE — TELEPHONE ENCOUNTER
"Patient call:  Pt stated provider: Dr. Segal     Actionable item: Appointment rescheduling    What is the reason for the call/chief complaint?    Incoming call with progressively worsening symptoms since Wednesday (no symptoms for 4 days prior to). Patient states that she is on her 4th prednisone taper pack and is not feeling better.     \"Almost visited ER last night\" for 102 fever, severe productive light brown mucous cough, and wheezing/tight chest. Patient also states that the amount of mucous makes her sound like she is gurgling and choking.  Also reports headache.    Using nebulizer/inhalers and prednisone. No OTC. \"None of the medications are working.\"      Dispo:Appointment rescheduled for today with Dr. Pyle for sooner availability.  Agrees with plan.   All questions answered. No further needs at the moment.     Reason for Disposition   Continuous (nonstop) coughing interferes with work or school and no improvement using cough treatment per Care Advice    Answer Assessment - Initial Assessment Questions  1. ONSET: \"When did the cough begin?\"       Ongoing. Better for 4 days then worse again on 3/13/24  2. SEVERITY: \"How bad is the cough today?\"       severe  3. SPUTUM: \"Describe the color of your sputum\" (none, dry cough; clear, white, yellow, green)     light brown  4. HEMOPTYSIS: \"Are you coughing up any blood?\" If so ask: \"How much?\" (flecks, streaks, tablespoons, etc.)      no  5. DIFFICULTY BREATHING: \"Are you having difficulty breathing?\" If Yes, ask: \"How bad is it?\" (e.g., mild, moderate, severe)     - MILD: No SOB at rest, mild SOB with walking, speaks normally in sentences, can lay down, no retractions, pulse < 100.     - MODERATE: SOB at rest, SOB with minimal exertion and prefers to sit, cannot lie down flat, speaks in phrases, mild retractions, audible wheezing, pulse 100-120.     - SEVERE: Very SOB at rest, speaks in single words, struggling to breathe, sitting hunched forward, retractions, " "pulse > 120       Wheezing/tight chest   6. FEVER: \"Do you have a fever?\" If Yes, ask: \"What is your temperature, how was it measured, and when did it start?\"      102 f  7. CARDIAC HISTORY: \"Do you have any history of heart disease?\" (e.g., heart attack, congestive heart failure)       no  8. LUNG HISTORY: \"Do you have any history of lung disease?\"  (e.g., pulmonary embolus, asthma, emphysema)      asthma    10. OTHER SYMPTOMS: \"Do you have any other symptoms?\" (e.g., runny nose, wheezing, chest pain)        Wheezing, headache, fever, light brown mucous, wheezing    Protocols used: Cough-ADULT-OH      "

## 2024-03-19 ENCOUNTER — APPOINTMENT (OUTPATIENT)
Dept: LAB | Facility: HOSPITAL | Age: 64
End: 2024-03-19
Payer: COMMERCIAL

## 2024-03-19 LAB
FLUAV RNA RESP QL NAA+PROBE: NEGATIVE
FLUBV RNA RESP QL NAA+PROBE: NEGATIVE
SARS-COV-2 RNA RESP QL NAA+PROBE: NEGATIVE

## 2024-03-19 PROCEDURE — 87070 CULTURE OTHR SPECIMN AEROBIC: CPT

## 2024-03-19 PROCEDURE — 87205 SMEAR GRAM STAIN: CPT

## 2024-03-19 NOTE — TELEPHONE ENCOUNTER
Please let patient know:     Dr. Pyle Tried calling the patient about the x-ray but it went to voicemail.  I sent the patient a [employer] message.  I would continue antibiotics as prescribed in my office visit.     The patient will follow-up in 4 weeks in the office.  However, if she does not feel better within 1 to 2 weeks, she should let us know.  This abnormality in the lingula would warrant a CT chest without contrast for further delineation if her symptoms do not improve

## 2024-03-21 DIAGNOSIS — R91.8 ABNORMALITY OF LUNG ON CHEST X-RAY: Primary | ICD-10-CM

## 2024-03-22 LAB
BACTERIA SPT RESP CULT: ABNORMAL
GRAM STN SPEC: ABNORMAL
GRAM STN SPEC: ABNORMAL

## 2024-03-27 ENCOUNTER — TELEPHONE (OUTPATIENT)
Age: 64
End: 2024-03-27

## 2024-03-27 ENCOUNTER — HOSPITAL ENCOUNTER (OUTPATIENT)
Dept: CT IMAGING | Facility: HOSPITAL | Age: 64
Discharge: HOME | End: 2024-03-27
Payer: COMMERCIAL

## 2024-03-27 DIAGNOSIS — R91.8 ABNORMALITY OF LUNG ON CHEST X-RAY: ICD-10-CM

## 2024-03-27 PROCEDURE — 71250 CT THORAX DX C-: CPT

## 2024-03-27 NOTE — TELEPHONE ENCOUNTER
"Patient incoming call with request for \"return to work with full duties tomorrow 3/28/24 note\" sent to her via email or she can .     Email- [email or web address]      All questions answered. No further needs at the moment.     "

## 2024-04-03 ENCOUNTER — TELEPHONE (OUTPATIENT)
Dept: PULMONOLOGY | Facility: CLINIC | Age: 64
End: 2024-04-03

## 2024-04-16 ENCOUNTER — OFFICE VISIT (OUTPATIENT)
Dept: PULMONOLOGY | Facility: CLINIC | Age: 64
End: 2024-04-16
Payer: COMMERCIAL

## 2024-04-16 VITALS
BODY MASS INDEX: 29.12 KG/M2 | HEART RATE: 70 BPM | WEIGHT: 174.8 LBS | DIASTOLIC BLOOD PRESSURE: 62 MMHG | OXYGEN SATURATION: 96 % | SYSTOLIC BLOOD PRESSURE: 108 MMHG | HEIGHT: 65 IN

## 2024-04-16 DIAGNOSIS — R05.3 CHRONIC COUGH: ICD-10-CM

## 2024-04-16 DIAGNOSIS — J45.40 MODERATE PERSISTENT REACTIVE AIRWAY DISEASE WITHOUT COMPLICATION: Primary | ICD-10-CM

## 2024-04-16 DIAGNOSIS — J30.89 NON-SEASONAL ALLERGIC RHINITIS, UNSPECIFIED TRIGGER: ICD-10-CM

## 2024-04-16 PROCEDURE — 94010 BREATHING CAPACITY TEST: CPT | Performed by: INTERNAL MEDICINE

## 2024-04-16 PROCEDURE — 99214 OFFICE O/P EST MOD 30 MIN: CPT | Performed by: INTERNAL MEDICINE

## 2024-04-16 NOTE — PROGRESS NOTES
Office Progress Note - Pulmonary    Barry Travis 64 y.o. female MRN: [id number]    Encounter: [id number]      Assessment:  Reactive airway disease.  Chronic cough  Allergic rhinitis.    Plan:   Spirometry.  Hold the Breztri.  Symbicort 160/4.5, 2 inhalations twice a day.  Albuterol nebulizer treatments 4 times a day as needed only for wheezing and chest tightness.  Saline nasal/sinus rinse once a day.  Fluticasone nasal spray 2 sprays to each nostril once a day.  Follow-up in 3 months.    Discussion:   The patient's cough has markedly improved.  It is due to reactive airway disease.  The [place] allergy panel was negative.  She has no peripheral eosinophilia.  Her cough in part is triggered by the postnasal dripping from the allergic rhinitis.  I have started her on saline nasal/sinus rinse once a day.  I have provided her with a sample bottle and showed her how to use it appropriately.  She will use it in the evening.  In the morning she will use fluticasone nasal spray 2 sprays to each nostril once a day.  Her spirometry was within normal limits today.  I have held the Breztri.  I told her to resume the Symbicort 160/4.5, 2 inhalations twice a day.  I told her to stop using the nebulizer treatments scheduled.  She will use them only as needed for wheezing and chest tightness.  I will see her in 3 months.      Subjective:   The patient is here for a follow-up visit.  Her cough has markedly improved.  Sometimes drinking cold drinks may trigger the cough.  No significant sputum production.  She is using the nebulizer treatments twice a day.  Also Breztri 2 inhalations twice a day.  She is taken fluticasone nasal spray 2 sprays to each nostril once a day.  She has significant postnasal dripping.    Review of systems:  A 12 point system review is done and aside from what is stated above the rest of the review of systems is negative.      Family history and social history are reviewed.    Medications list is  "reviewed.      Vitals: Blood pressure 108/62, pulse 70, height 5' 5\" (1.651 m), weight 79.3 kg (174 lb 12.8 oz), SpO2 96%.,     Physical Exam  Gen: Awake, alert, oriented x 3, no acute distress  HEENT: Mucous membranes moist, no oral lesions, no thrush  NECK: No accessory muscle use, JVP not elevated  Cardiac: Regular, single S1, single S2, no murmurs, no rubs, no gallops  Lungs: Lear breath sounds.  No wheezing or rhonchi.  Abdomen: normoactive bowel sounds, soft nontender, nondistended, no rebound or rigidity, no guarding  Extremities: no cyanosis, no clubbing, no edema  Neuro:  Grossly nonfocal.  Skin:  No rash.    CT scan of the chest is reviewed on PACS system.  It was unremarkable.    Spirometry was done today in the office.  It showed normal airflow and [place] capacity.    Lab Results   Component Value Date    WBC 11.92 (H) 03/18/2024    HGB 14.0 03/18/2024    HCT 41.8 03/18/2024    MCV 92 03/18/2024     03/18/2024     Lab Results   Component Value Date    SODIUM 136 03/18/2024    K 3.7 03/18/2024     03/18/2024    CO2 25 03/18/2024    BUN 19 03/18/2024    CREATININE 1.04 03/18/2024    GLUC 104 (H) 11/09/2018    CALCIUM 9.8 03/18/2024           " Home

## 2024-04-29 ENCOUNTER — OFFICE VISIT (OUTPATIENT)
Dept: FAMILY MEDICINE CLINIC | Facility: CLINIC | Age: 64
End: 2024-04-29
Payer: COMMERCIAL

## 2024-04-29 VITALS
SYSTOLIC BLOOD PRESSURE: 130 MMHG | OXYGEN SATURATION: 95 % | DIASTOLIC BLOOD PRESSURE: 80 MMHG | HEIGHT: 65 IN | BODY MASS INDEX: 28.72 KG/M2 | HEART RATE: 105 BPM | WEIGHT: 172.4 LBS

## 2024-04-29 DIAGNOSIS — E78.2 MIXED HYPERLIPIDEMIA: ICD-10-CM

## 2024-04-29 DIAGNOSIS — K21.9 GASTROESOPHAGEAL REFLUX DISEASE WITHOUT ESOPHAGITIS: ICD-10-CM

## 2024-04-29 DIAGNOSIS — Z00.00 HEALTH MAINTENANCE EXAMINATION: Primary | ICD-10-CM

## 2024-04-29 DIAGNOSIS — G43.009 MIGRAINE WITHOUT AURA AND WITHOUT STATUS MIGRAINOSUS, NOT INTRACTABLE: ICD-10-CM

## 2024-04-29 DIAGNOSIS — J45.20 MILD INTERMITTENT REACTIVE AIRWAY DISEASE WITHOUT COMPLICATION: ICD-10-CM

## 2024-04-29 DIAGNOSIS — Z23 ENCOUNTER FOR IMMUNIZATION: ICD-10-CM

## 2024-04-29 DIAGNOSIS — I10 ESSENTIAL HYPERTENSION: ICD-10-CM

## 2024-04-29 DIAGNOSIS — E66.3 OVERWEIGHT (BMI 25.0-29.9): ICD-10-CM

## 2024-04-29 DIAGNOSIS — F33.42 RECURRENT MAJOR DEPRESSIVE DISORDER, IN FULL REMISSION (HCC): ICD-10-CM

## 2024-04-29 PROBLEM — J45.41 MODERATE PERSISTENT ASTHMA WITH ACUTE EXACERBATION: Status: RESOLVED | Noted: 2024-02-12 | Resolved: 2024-04-29

## 2024-04-29 PROBLEM — R05.2 SUBACUTE COUGH: Status: RESOLVED | Noted: 2024-03-18 | Resolved: 2024-04-29

## 2024-04-29 PROBLEM — J45.909 REACTIVE AIRWAY DISEASE WITHOUT COMPLICATION: Status: ACTIVE | Noted: 2024-04-29

## 2024-04-29 PROBLEM — J45.21 INTERMITTENT ASTHMA WITH ACUTE EXACERBATION, UNSPECIFIED ASTHMA SEVERITY: Status: RESOLVED | Noted: 2024-02-22 | Resolved: 2024-04-29

## 2024-04-29 PROBLEM — J40 BRONCHITIS: Status: RESOLVED | Noted: 2024-03-18 | Resolved: 2024-04-29

## 2024-04-29 PROCEDURE — 99214 OFFICE O/P EST MOD 30 MIN: CPT | Performed by: FAMILY MEDICINE

## 2024-04-29 PROCEDURE — 90472 IMMUNIZATION ADMIN EACH ADD: CPT

## 2024-04-29 PROCEDURE — 99396 PREV VISIT EST AGE 40-64: CPT | Performed by: FAMILY MEDICINE

## 2024-04-29 PROCEDURE — 90715 TDAP VACCINE 7 YRS/> IM: CPT

## 2024-04-29 PROCEDURE — 90677 PCV20 VACCINE IM: CPT

## 2024-04-29 PROCEDURE — 90471 IMMUNIZATION ADMIN: CPT

## 2024-04-29 RX ORDER — PHENTERMINE HYDROCHLORIDE 37.5 MG/1
37.5 TABLET ORAL DAILY
Qty: 30 TABLET | Refills: 2 | Status: SHIPPED | OUTPATIENT
Start: 2024-04-29

## 2024-04-29 NOTE — PATIENT INSTRUCTIONS
Health maintenance is performed.  Mammogram and colonoscopy are up-to-date.  Blood work reviewed over the last 6 months.  Prediabetic with A1c of 5.8.  Mood is well-controlled with fluoxetine and bupropion.  Migraines are well-controlled.  Lungs are clear again after a prolonged episode of wheezing.  Workup was negative.  Trial of phentermine again 37.5 mg once daily.  Cutting out sugar would be helpful.  Prevnar vaccine for prevention of pneumococcal infections.  Tetanus with whooping cough vaccine.  At some point in future, should have second shingles vaccine.  Recheck in 3 months.    Wellness Visit for Adults   AMBULATORY CARE:   A wellness visit  is when you see your healthcare provider to get screened for health problems. Your healthcare provider will also give you advice on how to stay healthy. Write down your questions so you remember to ask them. Ask your healthcare provider how often you should have a wellness visit.  What happens at a wellness visit:  Your healthcare provider will ask about your health, and your family history of health problems. This includes high blood pressure, heart disease, and cancer. He or she will ask if you have symptoms that concern you, if you smoke, and about your mood. You may also be asked about your intake of medicines, supplements, food, and alcohol. Any of the following may be done:  Your weight  will be checked. Your height may also be checked so your body mass index (BMI) can be calculated. Your BMI shows if you are at a healthy weight.    Your blood pressure  and heart rate will be checked. Your temperature may also be checked.    Blood and urine tests  may be done. Blood tests may be done to check your cholesterol levels. Abnormal cholesterol levels increase your risk for heart disease and stroke. You may also need a blood or urine test to check for diabetes if you are at increased risk. Urine tests may be done to look for signs of an infection or kidney disease.    A  physical exam  includes checking your heartbeat and lungs with a stethoscope. Your healthcare provider may also check your skin to look for sun damage.    Screening tests  may be recommended. A screening test is done to check for diseases that may not cause symptoms. The screening tests you may need depend on your age, gender, family history, and lifestyle habits. For example, colorectal screening may be recommended if you are 50 years old or older.    Screening tests you need if you are a woman:   A Pap smear  is used to screen for cervical cancer. Pap smears are usually done every 3 to 5 years depending on your age. You may need them more often if you have had abnormal Pap smear test results in the past. Ask your healthcare provider how often you should have a Pap smear.    A mammogram  is an x-ray of your breasts to screen for breast cancer. Experts recommend mammograms every 2 years starting at age 50 years. You may need a mammogram at age 49 years or younger if you have an increased risk for breast cancer. Talk to your healthcare provider about when you should start having mammograms and how often you need them.    Vaccines you may need:   Get an influenza vaccine  every year. The influenza vaccine protects you from the flu. Several types of viruses cause the flu. The viruses change over time, so new vaccines are made each year.    Get a tetanus-diphtheria (Td) booster vaccine  every 10 years. This vaccine protects you against tetanus and diphtheria. Tetanus is a severe infection that may cause painful muscle spasms and lockjaw. Diphtheria is a severe bacterial infection that causes a thick covering in the back of your mouth and throat.    Get a human papillomavirus (HPV) vaccine  if you are female and aged 19 to 26 or male 19 to 21 and never received it. This vaccine protects you from HPV infection. HPV is the most common infection spread by sexual contact. HPV may also cause vaginal, penile, and anal  cancers.    Get a pneumococcal vaccine  if you are aged 65 years or older. The pneumococcal vaccine is an injection given to protect you from pneumococcal disease. Pneumococcal disease is an infection caused by pneumococcal bacteria. The infection may cause pneumonia, meningitis, or an ear infection.    Get a shingles vaccine  if you are 60 or older, even if you have had shingles before. The shingles vaccine is an injection to protect you from the varicella-zoster virus. This is the same virus that causes chickenpox. Shingles is a painful rash that develops in people who had chickenpox or have been exposed to the virus.    How to eat healthy:  My Plate is a model for planning healthy meals. It shows the types and amounts of foods that should go on your plate. Fruits and vegetables make up about half of your plate, and grains and protein make up the other half. A serving of dairy is included on the side of your plate. The amount of calories and serving sizes you need depends on your age, gender, weight, and height. Examples of healthy foods are listed below:  Eat a variety of vegetables  such as dark green, red, and orange vegetables. You can also include canned vegetables low in sodium (salt) and frozen vegetables without added butter or sauces.    Eat a variety of fresh fruits , canned fruit in 100% juice, frozen fruit, and dried fruit.    Include whole grains.  At least half of the grains you eat should be whole grains. Examples include whole-wheat bread, wheat pasta, brown rice, and whole-grain cereals such as oatmeal.    Eat a variety of protein foods such as seafood (fish and shellfish), lean meat, and poultry without skin (turkey and chicken). Examples of lean meats include pork leg, shoulder, or tenderloin, and beef round, sirloin, tenderloin, and extra lean ground beef. Other protein foods include eggs and egg substitutes, beans, peas, soy products, nuts, and seeds.    Choose low-fat dairy products such as  skim or 1% milk or low-fat yogurt, cheese, and cottage cheese.    Limit unhealthy fats  such as butter, hard margarine, and shortening.       Exercise:  Exercise at least 30 minutes per day on most days of the week. Some examples of exercise include walking, biking, dancing, and swimming. You can also fit in more physical activity by taking the stairs instead of the elevator or parking farther away from stores. Include muscle strengthening activities 2 days each week. Regular exercise provides many health benefits. It helps you manage your weight, and decreases your risk for type 2 diabetes, heart disease, stroke, and high blood pressure. Exercise can also help improve your mood. Ask your healthcare provider about the best exercise plan for you.       General health and safety guidelines:   Do not smoke.  Nicotine and other chemicals in cigarettes and cigars can cause lung damage. Ask your healthcare provider for information if you currently smoke and need help to quit. E-cigarettes or smokeless tobacco still contain nicotine. Talk to your healthcare provider before you use these products.    Limit alcohol.  A drink of alcohol is 12 ounces of beer, 5 ounces of wine, or 1½ ounces of liquor.    Lose weight, if needed.  Being overweight increases your risk of certain health conditions. These include heart disease, high blood pressure, type 2 diabetes, and certain types of cancer.    Protect your skin.  Do not sunbathe or use tanning beds. Use sunscreen with a SPF 15 or higher. Apply sunscreen at least 15 minutes before you go outside. Reapply sunscreen every 2 hours. Wear protective clothing, hats, and sunglasses when you are outside.    Drive safely.  Always wear your seatbelt. Make sure everyone in your car wears a seatbelt. A seatbelt can save your life if you are in an accident. Do not use your cell phone when you are driving. This could distract you and cause an accident. Pull over if you need to make a call or  send a text message.    Practice safe sex.  Use latex condoms if are sexually active and have more than one partner. Your healthcare provider may recommend screening tests for sexually transmitted infections (STIs).    Wear helmets, lifejackets, and protective gear.  Always wear a helmet when you ride a bike or motorcycle, go skiing, or play sports that could cause a head injury. Wear protective equipment when you play sports. Wear a lifejacket when you are on a boat or doing water sports.    © Copyright [employer] 2023 Information is for End User's use only and may not be sold, redistributed or otherwise used for commercial purposes.  The above information is an  only. It is not intended as medical advice for individual conditions or treatments. Talk to your doctor, nurse or pharmacist before following any medical regimen to see if it is safe and effective for you.

## 2024-04-29 NOTE — PROGRESS NOTES
Chief Complaint   Patient presents with    Physical Exam        HPI   Here for annual physical exam and follow-up of hypertension, depression, hyperlipidemia, and reactive airway disease.  And migraine headache.  Her lungs finally got better.  She was seen by pulmonary.  Had a CT of her lungs.  Currently on Symbicort 161 puff twice daily and doing okay.  Mood continues to be okay.  Continues on bupropion and fluoxetine.  Continues on 50 mg of topiramate and has not had a migraine for a long time.      Past Medical History:   Diagnosis Date    Anxiety state 01/09/2015    Esophageal reflux 04/28/2014    Essential hypertension 02/26/2016    Herpes simplex 06/12/2012    Major depression in complete remission (HCC) 02/21/2014    Migraine 03/20/2015    Mixed hyperlipidemia 06/11/2010    Obstructive sleep apnea syndrome 02/26/2016    Palpitations 03/20/2015    Personal history of colonic polyps 10/20/2020    Reactive airway disease without complication 04/29/2024    Sensorineural hearing loss 10/30/2014    Trigeminal neuralgia of left side of face 08/25/2016    Vitamin D deficiency 04/29/2014        Past Surgical History:   Procedure Laterality Date    APPENDECTOMY      EXOSTECTOMY Right 2004    Fifth toe    LAPAROSCOPIC CHOLECYSTECTOMY  2006    LAPAROSCOPIC HYSTERECTOMY  2007    TONSILLECTOMY         Social History     Tobacco Use    Smoking status: Never    Smokeless tobacco: Never   Substance Use Topics    Alcohol use: Yes       Social History     Social History Narrative    Wth Kev since 8/13.  since 10/7/17.   He has 3 grown children.and 5 grandchildren.      Kev  retired in 4/22. 4 years older. Has some lung issues.    First  committed suicide after they were .    2 sons . 1 grandson born 6/21/20.    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          The following portions of the patient's history were reviewed and updated as appropriate:  "allergies, current medications, past family history, past medical history, past social history, past surgical history, and problem list.      Review of Systems       /80 (BP Location: Left arm, Patient Position: Sitting, Cuff Size: Standard)   Pulse 105   Ht 5' 5\" (1.651 m)   Wt 78.2 kg (172 lb 6.4 oz)   SpO2 95%   BMI 28.69 kg/m²      Physical Exam   Healthy appearing individual in no acute distress.  Extraocular motions are intact.  Both ear drums are white.  Hearing is grossly intact.  Throat reveals no erythema.  Teeth are in good repair.  No neck nodes or thyromegaly.  Lungs are clear.  Heart regular with no murmurs or gallops.  Abdomen is soft and nontender.  No leg edema.  Skin reveals no apparent rash.  Neurologic grossly within normal limits.  Normal mood and affect.  Musculoskeletal exam grossly within normal limits.                Current Outpatient Medications:     albuterol (PROVENTIL HFA,VENTOLIN HFA) 90 mcg/act inhaler, Inhale 2 puffs every 4 (four) hours as needed for wheezing or shortness of breath, Disp: 18 g, Rfl: 5    atenolol (TENORMIN) 50 mg tablet, Take 1 tablet (50 mg total) by mouth daily, Disp: , Rfl:     atorvastatin (LIPITOR) 10 mg tablet, Take 1 tablet (10 mg total) by mouth daily, Disp: 90 tablet, Rfl: 1    buPROPion (WELLBUTRIN XL) 150 mg 24 hr tablet, Take 1 tablet (150 mg total) by mouth daily, Disp: 90 tablet, Rfl: 1    eletriptan (RELPAX) 40 MG tablet, Take 1 tablet (40 mg total) by mouth once as needed for migraine for up to 1 dose may repeat in 2 hours if necessary, Disp: 18 tablet, Rfl: 0    FLUoxetine (PROzac) 20 mg capsule, Take 1 capsule (20 mg total) by mouth daily, Disp: 90 capsule, Rfl: 1    ipratropium-albuterol (DUO-NEB) 0.5-2.5 mg/3 mL nebulizer solution, Take 3 mL by nebulization 4 (four) times a day, Disp: 60 mL, Rfl: 4    omeprazole (PriLOSEC) 40 MG capsule, Take 1 capsule (40 mg total) by mouth daily, Disp: 90 capsule, Rfl: 1    topiramate (TOPAMAX) 50 MG " tablet, Take 1 tablet (50 mg total) by mouth daily, Disp: 90 tablet, Rfl: 1    valACYclovir (VALTREX) 1,000 mg tablet, Take 1 tablet (1,000 mg total) by mouth 2 (two) times a day for 1 day (Patient not taking: Reported on 4/16/2024), Disp: 28 tablet, Rfl: 3     No problem-specific Assessment & Plan notes found for this encounter.       Diagnoses and all orders for this visit:    Health maintenance examination    Essential hypertension    Mixed hyperlipidemia    Migraine without aura and without status migrainosus, not intractable    Recurrent major depressive disorder, in full remission (HCC)    Gastroesophageal reflux disease without esophagitis    Overweight (BMI 25.0-29.9)    Mild intermittent reactive airway disease without complication    Encounter for immunization  -     TDAP VACCINE GREATER THAN OR EQUAL TO 8YO IM  -     Pneumococcal Conjugate Vaccine 20-valent (Pcv20)        Patient Instructions   Health maintenance is performed.  Mammogram and colonoscopy are up-to-date.  Blood work reviewed over the last 6 months.  Prediabetic with A1c of 5.8.  Mood is well-controlled with fluoxetine and bupropion.  Migraines are well-controlled.  Lungs are clear again after a prolonged episode of wheezing.  Workup was negative.  Trial of phentermine again 37.5 mg once daily.  Cutting out sugar would be helpful.  Prevnar vaccine for prevention of pneumococcal infections.  Tetanus with whooping cough vaccine.  At some point in future, should have second shingles vaccine.  Recheck in 3 months.    Wellness Visit for Adults   AMBULATORY CARE:   A wellness visit  is when you see your healthcare provider to get screened for health problems. Your healthcare provider will also give you advice on how to stay healthy. Write down your questions so you remember to ask them. Ask your healthcare provider how often you should have a wellness visit.  What happens at a wellness visit:  Your healthcare provider will ask about your health,  and your family history of health problems. This includes high blood pressure, heart disease, and cancer. He or she will ask if you have symptoms that concern you, if you smoke, and about your mood. You may also be asked about your intake of medicines, supplements, food, and alcohol. Any of the following may be done:  Your weight  will be checked. Your height may also be checked so your body mass index (BMI) can be calculated. Your BMI shows if you are at a healthy weight.    Your blood pressure  and heart rate will be checked. Your temperature may also be checked.    Blood and urine tests  may be done. Blood tests may be done to check your cholesterol levels. Abnormal cholesterol levels increase your risk for heart disease and stroke. You may also need a blood or urine test to check for diabetes if you are at increased risk. Urine tests may be done to look for signs of an infection or kidney disease.    A physical exam  includes checking your heartbeat and lungs with a stethoscope. Your healthcare provider may also check your skin to look for sun damage.    Screening tests  may be recommended. A screening test is done to check for diseases that may not cause symptoms. The screening tests you may need depend on your age, gender, family history, and lifestyle habits. For example, colorectal screening may be recommended if you are 50 years old or older.    Screening tests you need if you are a woman:   A Pap smear  is used to screen for cervical cancer. Pap smears are usually done every 3 to 5 years depending on your age. You may need them more often if you have had abnormal Pap smear test results in the past. Ask your healthcare provider how often you should have a Pap smear.    A mammogram  is an x-ray of your breasts to screen for breast cancer. Experts recommend mammograms every 2 years starting at age 50 years. You may need a mammogram at age 49 years or younger if you have an increased risk for breast cancer.  Talk to your healthcare provider about when you should start having mammograms and how often you need them.    Vaccines you may need:   Get an influenza vaccine  every year. The influenza vaccine protects you from the flu. Several types of viruses cause the flu. The viruses change over time, so new vaccines are made each year.    Get a tetanus-diphtheria (Td) booster vaccine  every 10 years. This vaccine protects you against tetanus and diphtheria. Tetanus is a severe infection that may cause painful muscle spasms and lockjaw. Diphtheria is a severe bacterial infection that causes a thick covering in the back of your mouth and throat.    Get a human papillomavirus (HPV) vaccine  if you are female and aged 19 to 26 or male 19 to 21 and never received it. This vaccine protects you from HPV infection. HPV is the most common infection spread by sexual contact. HPV may also cause vaginal, penile, and anal cancers.    Get a pneumococcal vaccine  if you are aged 65 years or older. The pneumococcal vaccine is an injection given to protect you from pneumococcal disease. Pneumococcal disease is an infection caused by pneumococcal bacteria. The infection may cause pneumonia, meningitis, or an ear infection.    Get a shingles vaccine  if you are 60 or older, even if you have had shingles before. The shingles vaccine is an injection to protect you from the varicella-zoster virus. This is the same virus that causes chickenpox. Shingles is a painful rash that develops in people who had chickenpox or have been exposed to the virus.    How to eat healthy:  My Plate is a model for planning healthy meals. It shows the types and amounts of foods that should go on your plate. Fruits and vegetables make up about half of your plate, and grains and protein make up the other half. A serving of dairy is included on the side of your plate. The amount of calories and serving sizes you need depends on your age, gender, weight, and height.  Examples of healthy foods are listed below:  Eat a variety of vegetables  such as dark green, red, and orange vegetables. You can also include canned vegetables low in sodium (salt) and frozen vegetables without added butter or sauces.    Eat a variety of fresh fruits , canned fruit in 100% juice, frozen fruit, and dried fruit.    Include whole grains.  At least half of the grains you eat should be whole grains. Examples include whole-wheat bread, wheat pasta, brown rice, and whole-grain cereals such as oatmeal.    Eat a variety of protein foods such as seafood (fish and shellfish), lean meat, and poultry without skin (turkey and chicken). Examples of lean meats include pork leg, shoulder, or tenderloin, and beef round, sirloin, tenderloin, and extra lean ground beef. Other protein foods include eggs and egg substitutes, beans, peas, soy products, nuts, and seeds.    Choose low-fat dairy products such as skim or 1% milk or low-fat yogurt, cheese, and cottage cheese.    Limit unhealthy fats  such as butter, hard margarine, and shortening.       Exercise:  Exercise at least 30 minutes per day on most days of the week. Some examples of exercise include walking, biking, dancing, and swimming. You can also fit in more physical activity by taking the stairs instead of the elevator or parking farther away from stores. Include muscle strengthening activities 2 days each week. Regular exercise provides many health benefits. It helps you manage your weight, and decreases your risk for type 2 diabetes, heart disease, stroke, and high blood pressure. Exercise can also help improve your mood. Ask your healthcare provider about the best exercise plan for you.       General health and safety guidelines:   Do not smoke.  Nicotine and other chemicals in cigarettes and cigars can cause lung damage. Ask your healthcare provider for information if you currently smoke and need help to quit. E-cigarettes or smokeless tobacco still  contain nicotine. Talk to your healthcare provider before you use these products.    Limit alcohol.  A drink of alcohol is 12 ounces of beer, 5 ounces of wine, or 1½ ounces of liquor.    Lose weight, if needed.  Being overweight increases your risk of certain health conditions. These include heart disease, high blood pressure, type 2 diabetes, and certain types of cancer.    Protect your skin.  Do not sunbathe or use tanning beds. Use sunscreen with a SPF 15 or higher. Apply sunscreen at least 15 minutes before you go outside. Reapply sunscreen every 2 hours. Wear protective clothing, hats, and sunglasses when you are outside.    Drive safely.  Always wear your seatbelt. Make sure everyone in your car wears a seatbelt. A seatbelt can save your life if you are in an accident. Do not use your cell phone when you are driving. This could distract you and cause an accident. Pull over if you need to make a call or send a text message.    Practice safe sex.  Use latex condoms if are sexually active and have more than one partner. Your healthcare provider may recommend screening tests for sexually transmitted infections (STIs).    Wear helmets, lifejackets, and protective gear.  Always wear a helmet when you ride a bike or motorcycle, go skiing, or play sports that could cause a head injury. Wear protective equipment when you play sports. Wear a lifejacket when you are on a boat or doing water sports.    © Copyright [employer] 2023 Information is for End User's use only and may not be sold, redistributed or otherwise used for commercial purposes.  The above information is an  only. It is not intended as medical advice for individual conditions or treatments. Talk to your doctor, nurse or pharmacist before following any medical regimen to see if it is safe and effective for you.

## 2024-05-01 ENCOUNTER — TELEPHONE (OUTPATIENT)
Age: 64
End: 2024-05-01

## 2024-05-01 NOTE — TELEPHONE ENCOUNTER
PA for Phentermine 37.5mg    Submitted via    []CMM-KEY   [x][employer]-Case ID # [id number]   []Faxed to plan   []Other website   []Phone call Case ID #     Office notes sent, clinical questions answered. Awaiting determination    Turnaround time for your insurance to make a decision on your Prior Authorization can take 7-21 business days.

## 2024-05-02 NOTE — TELEPHONE ENCOUNTER
PA for Phentermine Approved     Date(s) approved  April 28, 2024 to July 28, 2024     Case #    Patient advised by          [x] Striped [employer] Message  [] Phone call   []LMOM  []L/M to call office as no active Communication consent on file  []Unable to leave detailed message as VM not approved on Communication consent       Pharmacy advised by    [x]Fax  []Phone call    Approval letter scanned into Media Yes

## 2024-07-09 ENCOUNTER — OFFICE VISIT (OUTPATIENT)
Dept: PULMONOLOGY | Facility: CLINIC | Age: 64
End: 2024-07-09
Payer: COMMERCIAL

## 2024-07-09 VITALS
HEART RATE: 66 BPM | TEMPERATURE: 97.9 F | BODY MASS INDEX: 29.32 KG/M2 | DIASTOLIC BLOOD PRESSURE: 68 MMHG | OXYGEN SATURATION: 95 % | HEIGHT: 65 IN | WEIGHT: 176 LBS | SYSTOLIC BLOOD PRESSURE: 118 MMHG

## 2024-07-09 DIAGNOSIS — G47.33 OBSTRUCTIVE SLEEP APNEA SYNDROME: Primary | ICD-10-CM

## 2024-07-09 DIAGNOSIS — J45.20 MILD INTERMITTENT REACTIVE AIRWAY DISEASE WITHOUT COMPLICATION: ICD-10-CM

## 2024-07-09 PROCEDURE — 99213 OFFICE O/P EST LOW 20 MIN: CPT | Performed by: NURSE PRACTITIONER

## 2024-07-09 NOTE — PROGRESS NOTES
Pulmonary Follow-Up Note   Barry Travis 64 y.o. female MRN: [id number]  7/9/2024      Assessment/Plan:    Problem List Items Addressed This Visit       Obstructive sleep apnea syndrome - Primary     Patient has history of sleep apnea.  She has old machine that she does not use regularly from over 10 years ago.  She would like to be tested again and get a new machine if necessary.  Sleep study ordered         Relevant Orders    Ambulatory Referral to Sleep Medicine    Reactive airway disease without complication       No follow-ups on file.    All of Barry's questions were answered prior to leaving the office today. She will follow-up in 4 months or sooner should the need arise. She is aware to call our office with any further questions or concerns.    History of Present Illness   Reason for Visit: 3 month follow up  Chief Complaint: Feels great  HPI: Barry Travis is a 64 y.o. female who presents to the office today for 3 month follow up.  Patient was last seen in the office in April 2024 by Dr. Andrews for reactive airway disease, allergic rhinitis and chronic cough.  Endorses cough is completely gone and she feels great.  She was switched to Symbicort at previous visit but is now only using it once a day.  She is not using nebulizers any longer either.    She continues to use fluticasone nasal spray and nasal rinses for post nasal drip, albuterol as needed but endorses rarely needed albuterol.    Patient has history of sleep apnea, with old machine from 10 years ago she cannot always tolerate it.  She requested a new sleep study, with the hopes of possible new machine.  Sleep study ordered    She will follow up in 4 months    Review of Systems   Constitutional:  Negative for chills, diaphoresis and fever.   Respiratory:  Negative for cough, chest tightness, shortness of breath and wheezing.        Historical Information   Past Medical History:   Diagnosis Date    Anxiety state 01/09/2015    Esophageal reflux  04/28/2014    Essential hypertension 02/26/2016    Herpes simplex 06/12/2012    Major depression in complete remission (HCC) 02/21/2014    Migraine 03/20/2015    Mixed hyperlipidemia 06/11/2010    Obstructive sleep apnea syndrome 02/26/2016    Palpitations 03/20/2015    Personal history of colonic polyps 10/20/2020    Reactive airway disease without complication 04/29/2024    Sensorineural hearing loss 10/30/2014    Trigeminal neuralgia of left side of face 08/25/2016    Vitamin D deficiency 04/29/2014     Past Surgical History:   Procedure Laterality Date    APPENDECTOMY      EXOSTECTOMY Right 2004    Fifth toe    LAPAROSCOPIC CHOLECYSTECTOMY  2006    LAPAROSCOPIC HYSTERECTOMY  2007    TONSILLECTOMY       Family History   Problem Relation Age of Onset    Ovarian cancer Mother     Hypertension Father     Diabetes Father     Hyperlipidemia Father     No Known Problems Maternal Grandmother     No Known Problems Maternal Grandfather     No Known Problems Paternal Grandmother     No Known Problems Paternal Grandfather     Breast cancer Neg Hx      Social History   Social History     Substance and Sexual Activity   Alcohol Use Yes     Social History     Substance and Sexual Activity   Drug Use Never     Social History     Tobacco Use   Smoking Status Never   Smokeless Tobacco Never     E-Cigarette/Vaping    E-Cigarette Use Never User      E-Cigarette/Vaping Substances    Nicotine No     THC No     CBD No     Flavoring No     Other No     Unknown No        Meds/Allergies     Current Outpatient Medications:     albuterol (PROVENTIL HFA,VENTOLIN HFA) 90 mcg/act inhaler, Inhale 2 puffs every 4 (four) hours as needed for wheezing or shortness of breath, Disp: 18 g, Rfl: 5    atenolol (TENORMIN) 50 mg tablet, Take 1 tablet (50 mg total) by mouth daily, Disp: , Rfl:     atorvastatin (LIPITOR) 10 mg tablet, Take 1 tablet (10 mg total) by mouth daily, Disp: 90 tablet, Rfl: 1    buPROPion (WELLBUTRIN XL) 150 mg 24 hr tablet, Take  "1 tablet (150 mg total) by mouth daily, Disp: 90 tablet, Rfl: 1    eletriptan (RELPAX) 40 MG tablet, Take 1 tablet (40 mg total) by mouth once as needed for migraine for up to 1 dose may repeat in 2 hours if necessary, Disp: 18 tablet, Rfl: 0    FLUoxetine (PROzac) 20 mg capsule, Take 1 capsule (20 mg total) by mouth daily, Disp: 90 capsule, Rfl: 1    ipratropium-albuterol (DUO-NEB) 0.5-2.5 mg/3 mL nebulizer solution, Take 3 mL by nebulization 4 (four) times a day, Disp: 60 mL, Rfl: 4    omeprazole (PriLOSEC) 40 MG capsule, Take 1 capsule (40 mg total) by mouth daily, Disp: 90 capsule, Rfl: 1    phentermine (ADIPEX-P) 37.5 MG tablet, Take 1 tablet (37.5 mg total) by mouth daily, Disp: 30 tablet, Rfl: 2    topiramate (TOPAMAX) 50 MG tablet, Take 1 tablet (50 mg total) by mouth daily, Disp: 90 tablet, Rfl: 1    valACYclovir (VALTREX) 1,000 mg tablet, Take 1 tablet (1,000 mg total) by mouth 2 (two) times a day for 1 day, Disp: 28 tablet, Rfl: 3  No Known Allergies    Vitals: Blood pressure 118/68, pulse 66, temperature 97.9 °F (36.6 °C), temperature source Tympanic, height 5' 5\" (1.651 m), weight 79.8 kg (176 lb), SpO2 95%. Body mass index is 29.29 kg/m². Oxygen Therapy  SpO2: 95 %  Oxygen Therapy: None (Room air)RA    Physical Exam:  Physical Exam  Vitals reviewed.   Constitutional:       General: She is not in acute distress.     Appearance: She is not ill-appearing.   HENT:      Head: Normocephalic and atraumatic.      Right Ear: External ear normal.      Left Ear: External ear normal.      Nose: Nose normal.      Mouth/Throat:      Mouth: Mucous membranes are moist.      Pharynx: Oropharynx is clear.   Eyes:      Extraocular Movements: Extraocular movements intact.      Pupils: Pupils are equal, round, and reactive to light.   Cardiovascular:      Rate and Rhythm: Normal rate and regular rhythm.      Pulses: Normal pulses.      Heart sounds: Normal heart sounds.   Pulmonary:      Effort: Pulmonary effort is normal. " "     Breath sounds: Normal breath sounds.   Musculoskeletal:      Cervical back: Normal range of motion and neck supple.   Skin:     General: Skin is warm and dry.   Neurological:      Mental Status: She is alert and oriented to person, place, and time.   Psychiatric:         Mood and Affect: Mood normal.         Behavior: Behavior normal.         Thought Content: Thought content normal.         Judgment: Judgment normal.       Imaging and other studies: I have personally reviewed pertinent reports.     CT chest 03/27/2024 shows clear lungs symmetric endobronchial lesion    Pulmonary Results (PFTs, PSG): I have personally reviewed pertinent reports.     Office spirometry completed 04/16/2024 shows normal airflow      JOYCELYN Gautam  [facility]      Portions of the record may have been created with voice recognition software.  Occasional wrong word or \"sound a like\" substitutions may have occurred due to the inherent limitations of voice recognition software.  Read the chart carefully and recognize, using context, where substitutions have occurred or contact the dictating provider.  "

## 2024-07-10 DIAGNOSIS — G47.33 OSA (OBSTRUCTIVE SLEEP APNEA): Primary | ICD-10-CM

## 2024-07-10 DIAGNOSIS — K21.9 GASTROESOPHAGEAL REFLUX DISEASE WITHOUT ESOPHAGITIS: ICD-10-CM

## 2024-07-10 DIAGNOSIS — I10 ESSENTIAL HYPERTENSION: ICD-10-CM

## 2024-07-10 DIAGNOSIS — E78.2 MIXED HYPERLIPIDEMIA: ICD-10-CM

## 2024-07-10 DIAGNOSIS — G43.909 MIGRAINE WITHOUT STATUS MIGRAINOSUS, NOT INTRACTABLE, UNSPECIFIED MIGRAINE TYPE: ICD-10-CM

## 2024-07-10 DIAGNOSIS — E66.3 OVERWEIGHT (BMI 25.0-29.9): ICD-10-CM

## 2024-07-10 DIAGNOSIS — F32.5 MAJOR DEPRESSION IN COMPLETE REMISSION (HCC): ICD-10-CM

## 2024-07-10 DIAGNOSIS — F41.1 ANXIETY STATE: ICD-10-CM

## 2024-07-10 NOTE — ASSESSMENT & PLAN NOTE
Patient has history of sleep apnea.  She has old machine that she does not use regularly from over 10 years ago.  She would like to be tested again and get a new machine if necessary.  Sleep study ordered

## 2024-07-10 NOTE — ASSESSMENT & PLAN NOTE
Spirometry completed April 2024 was normal    Patient on Symbicort 2 puffs twice daily but endorses only using once daily, Albuterol 90 mcg/ACT 2 puffs every 6 hours as needed and DuoNebs every 6 hours as needed    Patient endorses complete resolution of cough at this time she continues

## 2024-07-11 DIAGNOSIS — E78.2 MIXED HYPERLIPIDEMIA: ICD-10-CM

## 2024-07-11 DIAGNOSIS — G43.909 MIGRAINE WITHOUT STATUS MIGRAINOSUS, NOT INTRACTABLE, UNSPECIFIED MIGRAINE TYPE: ICD-10-CM

## 2024-07-11 DIAGNOSIS — F41.1 ANXIETY STATE: ICD-10-CM

## 2024-07-11 DIAGNOSIS — E66.3 OVERWEIGHT (BMI 25.0-29.9): ICD-10-CM

## 2024-07-11 DIAGNOSIS — K21.9 GASTROESOPHAGEAL REFLUX DISEASE WITHOUT ESOPHAGITIS: ICD-10-CM

## 2024-07-11 DIAGNOSIS — F32.5 MAJOR DEPRESSION IN COMPLETE REMISSION (HCC): ICD-10-CM

## 2024-07-11 RX ORDER — ATENOLOL 50 MG/1
50 TABLET ORAL DAILY
Qty: 100 TABLET | Refills: 1 | Status: SHIPPED | OUTPATIENT
Start: 2024-07-11

## 2024-07-11 RX ORDER — TOPIRAMATE 50 MG/1
50 TABLET, FILM COATED ORAL DAILY
Qty: 100 TABLET | Refills: 1 | Status: SHIPPED | OUTPATIENT
Start: 2024-07-11

## 2024-07-11 RX ORDER — OMEPRAZOLE 40 MG/1
40 CAPSULE, DELAYED RELEASE ORAL DAILY
Qty: 100 CAPSULE | Refills: 1 | Status: SHIPPED | OUTPATIENT
Start: 2024-07-11

## 2024-07-11 RX ORDER — ATORVASTATIN CALCIUM 10 MG/1
10 TABLET, FILM COATED ORAL DAILY
Qty: 100 TABLET | Refills: 1 | Status: SHIPPED | OUTPATIENT
Start: 2024-07-11

## 2024-07-11 RX ORDER — BUPROPION HYDROCHLORIDE 150 MG/1
150 TABLET ORAL DAILY
Qty: 100 TABLET | Refills: 1 | Status: SHIPPED | OUTPATIENT
Start: 2024-07-11

## 2024-07-11 RX ORDER — PHENTERMINE HYDROCHLORIDE 37.5 MG/1
37.5 TABLET ORAL DAILY
Qty: 30 TABLET | Refills: 0 | Status: SHIPPED | OUTPATIENT
Start: 2024-07-11 | End: 2024-07-12

## 2024-07-11 RX ORDER — FLUOXETINE HYDROCHLORIDE 20 MG/1
20 CAPSULE ORAL DAILY
Qty: 100 CAPSULE | Refills: 1 | Status: SHIPPED | OUTPATIENT
Start: 2024-07-11

## 2024-07-12 RX ORDER — PHENTERMINE HYDROCHLORIDE 37.5 MG/1
37.5 TABLET ORAL DAILY
Qty: 30 TABLET | Refills: 0 | Status: SHIPPED | OUTPATIENT
Start: 2024-07-12

## 2024-07-12 RX ORDER — OMEPRAZOLE 40 MG/1
40 CAPSULE, DELAYED RELEASE ORAL DAILY
Qty: 90 CAPSULE | Refills: 0 | OUTPATIENT
Start: 2024-07-12

## 2024-07-12 RX ORDER — TOPIRAMATE 50 MG/1
50 TABLET, FILM COATED ORAL DAILY
Qty: 90 TABLET | Refills: 0 | OUTPATIENT
Start: 2024-07-12

## 2024-07-12 RX ORDER — BUPROPION HYDROCHLORIDE 150 MG/1
150 TABLET ORAL DAILY
Qty: 90 TABLET | Refills: 0 | OUTPATIENT
Start: 2024-07-12

## 2024-07-12 RX ORDER — FLUOXETINE HYDROCHLORIDE 20 MG/1
20 CAPSULE ORAL DAILY
Qty: 90 CAPSULE | Refills: 0 | OUTPATIENT
Start: 2024-07-12

## 2024-07-12 RX ORDER — ATORVASTATIN CALCIUM 10 MG/1
10 TABLET, FILM COATED ORAL DAILY
Qty: 90 TABLET | Refills: 0 | OUTPATIENT
Start: 2024-07-12

## 2024-08-22 ENCOUNTER — HOSPITAL ENCOUNTER (OUTPATIENT)
Dept: SLEEP CENTER | Facility: CLINIC | Age: 64
Discharge: HOME | End: 2024-08-22
Payer: COMMERCIAL

## 2024-08-22 DIAGNOSIS — G47.33 OSA (OBSTRUCTIVE SLEEP APNEA): ICD-10-CM

## 2024-08-22 PROCEDURE — G0399 HOME SLEEP TEST/TYPE 3 PORTA: HCPCS

## 2024-08-22 NOTE — PROGRESS NOTES
Home Sleep Study Documentation    HOME STUDY DEVICE: Noxturnal yes                                           Gretel G3 no      Pre-Sleep Home Study:    Set-up and instructions performed by: FLORIDALMA Clayton, LAURA    Technician performed demonstration for Patient: yes    Return demonstration performed by Patient: yes    Written instructions provided to Patient: yes    Patient signed consent form: yes        Post-Sleep Home Study:    Additional comments by Patient: pending    Home Sleep Study Failed:pending    Failure reason: pending    Reported or Detected: pending    Scored by: pending

## 2024-08-26 PROBLEM — G47.33 OSA (OBSTRUCTIVE SLEEP APNEA): Status: ACTIVE | Noted: 2024-08-26

## 2024-08-26 PROCEDURE — 95806 SLEEP STUDY UNATT&RESP EFFT: CPT

## 2024-09-09 ENCOUNTER — TELEPHONE (OUTPATIENT)
Age: 64
End: 2024-09-09

## 2024-09-09 DIAGNOSIS — G47.33 OBSTRUCTIVE SLEEP APNEA SYNDROME: Primary | ICD-10-CM

## 2024-09-09 DIAGNOSIS — G47.34 NOCTURNAL HYPOXIA: ICD-10-CM

## 2024-09-10 ENCOUNTER — TELEPHONE (OUTPATIENT)
Age: 64
End: 2024-09-10

## 2024-09-10 NOTE — TELEPHONE ENCOUNTER
Called and left patient a voicemail.  Ordered auto CPAP and overnight pulse oximetry.  She can call the office if she has any further questions or concerns

## 2024-09-26 LAB

## 2024-10-15 ENCOUNTER — OFFICE VISIT (OUTPATIENT)
Dept: BARIATRICS | Facility: CLINIC | Age: 64
End: 2024-10-15
Payer: COMMERCIAL

## 2024-10-15 VITALS
HEART RATE: 67 BPM | WEIGHT: 174.8 LBS | DIASTOLIC BLOOD PRESSURE: 82 MMHG | SYSTOLIC BLOOD PRESSURE: 118 MMHG | OXYGEN SATURATION: 96 % | RESPIRATION RATE: 18 BRPM | HEIGHT: 65 IN | BODY MASS INDEX: 29.12 KG/M2

## 2024-10-15 DIAGNOSIS — E78.2 MIXED HYPERLIPIDEMIA: ICD-10-CM

## 2024-10-15 DIAGNOSIS — E66.3 OVERWEIGHT WITH BODY MASS INDEX (BMI) OF 29 TO 29.9 IN ADULT: Primary | ICD-10-CM

## 2024-10-15 DIAGNOSIS — I10 ESSENTIAL HYPERTENSION: ICD-10-CM

## 2024-10-15 DIAGNOSIS — G47.33 OBSTRUCTIVE SLEEP APNEA SYNDROME: ICD-10-CM

## 2024-10-15 DIAGNOSIS — G43.009 MIGRAINE WITHOUT AURA AND WITHOUT STATUS MIGRAINOSUS, NOT INTRACTABLE: ICD-10-CM

## 2024-10-15 DIAGNOSIS — F33.42 RECURRENT MAJOR DEPRESSIVE DISORDER, IN FULL REMISSION (HCC): ICD-10-CM

## 2024-10-15 PROCEDURE — 99204 OFFICE O/P NEW MOD 45 MIN: CPT | Performed by: NURSE PRACTITIONER

## 2024-10-15 RX ORDER — TIRZEPATIDE 2.5 MG/.5ML
2.5 INJECTION, SOLUTION SUBCUTANEOUS WEEKLY
Qty: 2 ML | Refills: 0 | Status: SHIPPED | OUTPATIENT
Start: 2024-10-15 | End: 2024-11-12

## 2024-10-15 NOTE — ASSESSMENT & PLAN NOTE
- Patient is pursuing Conservative Program and follow up visits with medical weight management provider  - Initial weight loss goal of 5-10% weight loss for improved health. Weight loss can improve patient's co-morbid conditions and/or prevent weight-related complications.  - Explained the importance of continuing lifestyle changes in addition to any anti-obesity medications.   - Labs reviewed from 3/2024    General Recommendations:  Nutrition:  Eat breakfast daily.  Do not skip meals.      Food log (ie.) www.[employer].com, sparkpeople.com, loseit.com, calorieking.com, etc.     Practice mindful eating.  Be sure to set aside time to eat, eat slowly, and savor your food.     Hydration:    At least 64oz of water daily.  No sugar sweetened beverages.  No juice (eat the fruit instead).     Exercise:  Studies have shown that the ideal exercise goal is somewhere between 150 to 300 minutes of moderate intensity exercise a week.  Start with exercising 10 minutes every other day and gradually increase physical activity with a goal of at least 150 minutes of moderate intensity exercise a week, divided over at least 3 days a week.  An example of this would be exercising 30 minutes a day, 5 days a week.  Resistance training can increase muscle mass and increase our resting metabolic rate.   FULL BODY resistance training is recommended 2-3 times a week.  Do not do this on consecutive days to allow for muscle recovery.     Aim for a bare minimum 5000 steps, even on days you do not exercise.     Monitoring:   Weigh yourself daily.  If this causes undue stress, then just weigh yourself once a week.  Weigh yourself the same time of the day with the same amount of clothing on.  Preferably this should be done after waking up, before you eat, and with no clothing or minimal clothing on.     Specific Goals:  Patient lifestyle habits were reviewed and barriers to weight loss were addressed today.  Nutrition was discussed and patient  will be meeting with a dietitian to better structure her eating patterns and make sure she is hitting her nutritional requirements.  Suspect the patient is over consuming carbohydrates and lacking in protein.  She also will likely need to work on better structuring her nutrition so she is eating evenly throughout the day and not consuming the bulk of her calories late in the day.  Hydration was discussed and patient was encouraged to increase her water intake.  Activity was discussed and patient was encouraged to start with some strength training in addition to her daily dog walks.    Medications discussed:  Phentermine was tried with no weight loss  Patient is on topiramate for migraine management  Patient is on bupropion for depression management -may consider addition of naltrexone to help with cravings  GLP-1 medications were discussed and patient was interested in starting on Zepbound.    Zepbound Instructions:    - Begin Zepbound 2.5 mg subcutaneously once a week. Dose changes may occur after 4 doses if medication is tolerated. You will be assessed prior to each dose change to make sure you are tolerating the medication well.  - Please message me when you have 2 pens left from the prescription so there are no lapses in treatment.  - If you have been off the medication for more than 14 days please contact the office as you will need to restart the titration at the starting dose again to avoid significant side effects or adverse events.  - Visit Zepbound.com for further information/injection instructions.   -Please eat small frequent meals to help reduce nausea. Lemon water and saltine crackers may help with this.   - Side effects of Zepbound discussed: nausea, vomiting, diarrhea, and constipation. If you experience fever, nausea/vomiting, and pain radiating to your back this may be a sign of pancreatitis. Please go to the emergency room if this occurs.  - If on oral birth control a 2nd method of birth control  is recommended during the 1st 8 weeks of therapy and for 4 weeks after any dosage change.   - Patient understands the side effects of the medication and proper administration. Patient agrees with the treatment plan and all questions were answered.  - Supply concerns were discussed with patient and patient voiced understanding that they will need to call with adequate time for refills to avoid any lapses in treatment.  Patient may also have to call around to different pharmacies to see if any pharmacy has the appropriate dose in stock.

## 2024-10-15 NOTE — ASSESSMENT & PLAN NOTE
Treatment includes atorvastatin 10 mg  Weight loss and diet changes may improve cholesterol levels

## 2024-10-15 NOTE — PROGRESS NOTES
Assessment/Plan:    Overweight with body mass index (BMI) of 29 to 29.9 in adult  - Patient is pursuing Conservative Program and follow up visits with medical weight management provider  - Initial weight loss goal of 5-10% weight loss for improved health. Weight loss can improve patient's co-morbid conditions and/or prevent weight-related complications.  - Explained the importance of continuing lifestyle changes in addition to any anti-obesity medications.   - Labs reviewed from 3/2024    General Recommendations:  Nutrition:  Eat breakfast daily.  Do not skip meals.      Food log (ie.) www.[employer].com, sparkpeople.com, loseit.com, calorieking.com, etc.     Practice mindful eating.  Be sure to set aside time to eat, eat slowly, and savor your food.     Hydration:    At least 64oz of water daily.  No sugar sweetened beverages.  No juice (eat the fruit instead).     Exercise:  Studies have shown that the ideal exercise goal is somewhere between 150 to 300 minutes of moderate intensity exercise a week.  Start with exercising 10 minutes every other day and gradually increase physical activity with a goal of at least 150 minutes of moderate intensity exercise a week, divided over at least 3 days a week.  An example of this would be exercising 30 minutes a day, 5 days a week.  Resistance training can increase muscle mass and increase our resting metabolic rate.   FULL BODY resistance training is recommended 2-3 times a week.  Do not do this on consecutive days to allow for muscle recovery.     Aim for a bare minimum 5000 steps, even on days you do not exercise.     Monitoring:   Weigh yourself daily.  If this causes undue stress, then just weigh yourself once a week.  Weigh yourself the same time of the day with the same amount of clothing on.  Preferably this should be done after waking up, before you eat, and with no clothing or minimal clothing on.     Specific Goals:  Patient lifestyle habits were reviewed and  barriers to weight loss were addressed today.  Nutrition was discussed and patient will be meeting with a dietitian to better structure her eating patterns and make sure she is hitting her nutritional requirements.  Suspect the patient is over consuming carbohydrates and lacking in protein.  She also will likely need to work on better structuring her nutrition so she is eating evenly throughout the day and not consuming the bulk of her calories late in the day.  Hydration was discussed and patient was encouraged to increase her water intake.  Activity was discussed and patient was encouraged to start with some strength training in addition to her daily dog walks.    Medications discussed:  Phentermine was tried with no weight loss  Patient is on topiramate for migraine management  Patient is on bupropion for depression management -may consider addition of naltrexone to help with cravings  GLP-1 medications were discussed and patient was interested in starting on Zepbound.    Zepbound Instructions:    - Begin Zepbound 2.5 mg subcutaneously once a week. Dose changes may occur after 4 doses if medication is tolerated. You will be assessed prior to each dose change to make sure you are tolerating the medication well.  - Please message me when you have 2 pens left from the prescription so there are no lapses in treatment.  - If you have been off the medication for more than 14 days please contact the office as you will need to restart the titration at the starting dose again to avoid significant side effects or adverse events.  - Visit Zepbound.com for further information/injection instructions.   -Please eat small frequent meals to help reduce nausea. Lemon water and saltine crackers may help with this.   - Side effects of Zepbound discussed: nausea, vomiting, diarrhea, and constipation. If you experience fever, nausea/vomiting, and pain radiating to your back this may be a sign of pancreatitis. Please go to the  emergency room if this occurs.  - If on oral birth control a 2nd method of birth control is recommended during the 1st 8 weeks of therapy and for 4 weeks after any dosage change.   - Patient understands the side effects of the medication and proper administration. Patient agrees with the treatment plan and all questions were answered.  - Supply concerns were discussed with patient and patient voiced understanding that they will need to call with adequate time for refills to avoid any lapses in treatment.  Patient may also have to call around to different pharmacies to see if any pharmacy has the appropriate dose in stock.        Obstructive sleep apnea syndrome  Encourage nightly use of CPAP machine    Essential hypertension  Currently on atenolol 50 mg  Blood pressure well-controlled today    Migraine  Migraine stable on topiramate    Major depression in complete remission (HCC)  Currently on bupropion 150 mg and fluoxetine 20 mg  Symptoms stable    Mixed hyperlipidemia  Treatment includes atorvastatin 10 mg  Weight loss and diet changes may improve cholesterol levels         Barry was seen today for consult.    Diagnoses and all orders for this visit:    Overweight with body mass index (BMI) of 29 to 29.9 in adult  -     tirzepatide (Zepbound) 2.5 mg/0.5 mL auto-injector; Inject 0.5 mL (2.5 mg total) under the skin once a week for 28 days    Obstructive sleep apnea syndrome  -     tirzepatide (Zepbound) 2.5 mg/0.5 mL auto-injector; Inject 0.5 mL (2.5 mg total) under the skin once a week for 28 days    Mixed hyperlipidemia  -     tirzepatide (Zepbound) 2.5 mg/0.5 mL auto-injector; Inject 0.5 mL (2.5 mg total) under the skin once a week for 28 days    Essential hypertension    Migraine without aura and without status migrainosus, not intractable    Recurrent major depressive disorder, in full remission (HCC)         Patient denies personal history of pancreatitis. Patient also denies personal and family history of  medullary thyroid cancer and multiple endocrine neoplasia type 2 (MEN 2 tumor).   Medication consent was reviewed today and all questions were answered.  Patient agreed with all bulleted points.  Consent was signed, scanned into chart and patient was provided with a copy for their records.       Total time spent reviewing chart, interviewing patient, examining patient, discussing plan, answering all questions, and documentin min, with >50% face-to-face time spent counseling patient on nonsurgical interventions for the treatment of excess weight. Discussed in detail nonsurgical options including intensive lifestyle intervention program, very low-calorie diet program and conservative program.  Discussed the role of weight loss medications.  Counseled patient on diet behavior and exercise modification for weight loss.    Follow up in approximately 3 months with Non-Surgical Physician/Advanced Practitioner.    Subjective:   Chief Complaint   Patient presents with    Consult     Initial Consult. SHAMIKA +. Uses C-pap       Patient ID: Barry Travis  is a 64 y.o. female with excess weight/obesity here to pursue weight management.  Previous notes and records have been reviewed.    Past Medical History:   Diagnosis Date    Anxiety state 2015    Esophageal reflux 2014    Essential hypertension 2016    Herpes simplex 2012    Major depression in complete remission (HCC) 2014    Migraine 2015    Mixed hyperlipidemia 2010    Obstructive sleep apnea syndrome 2016    Palpitations 2015    Personal history of colonic polyps 10/20/2020    Reactive airway disease without complication 2024    Sensorineural hearing loss 10/30/2014    Trigeminal neuralgia of left side of face 2016    Vitamin D deficiency 2014     Past Surgical History:   Procedure Laterality Date    APPENDECTOMY      EXOSTECTOMY Right 2004    Fifth toe    LAPAROSCOPIC CHOLECYSTECTOMY       LAPAROSCOPIC HYSTERECTOMY  2007    TONSILLECTOMY         HPI:  Wt Readings from Last 20 Encounters:   10/15/24 79.3 kg (174 lb 12.8 oz)   07/09/24 79.8 kg (176 lb)   04/29/24 78.2 kg (172 lb 6.4 oz)   04/16/24 79.3 kg (174 lb 12.8 oz)   03/18/24 78 kg (172 lb)   02/12/24 79.8 kg (176 lb)   01/09/24 78.9 kg (174 lb)   10/13/23 77.4 kg (170 lb 9.6 oz)   08/21/23 78.6 kg (173 lb 3.2 oz)   07/20/23 75.3 kg (166 lb)   12/12/22 75.3 kg (166 lb)   06/07/22 75.8 kg (167 lb)   02/18/22 74.4 kg (164 lb)   11/03/20 75.3 kg (166 lb)   10/20/20 74.7 kg (164 lb 9.6 oz)   10/05/20 73.5 kg (162 lb)   09/29/20 74.3 kg (163 lb 14.4 oz)   09/14/20 73.9 kg (163 lb)   08/21/20 73.2 kg (161 lb 6.4 oz)       Patient presents today to medical weight management office for consult.  Patient has been able to control her weight for most of her life.  She had a hysterectomy when she was 47 years old and started to gain weight.  Her highest weight was 183 pounds.  Eventually over time she was able to reduce some of the weight and maintained around 165 pounds.  She decided to do a keto diet and went down to as low as 155 pounds which she felt very comfortable.  She has found over the past year or 2 that she has started to regain weight and is now gained 20 pounds back.  She does have obstructive sleep apnea which is worse when she has higher weight.  She uses a CPAP every night.  Her primary care tried her on phentermine and she had more energy but did not see any weight loss.  Patient is also on topiramate for migraine management and Wellbutrin and Prozac for her depression.  Patient currently works for [employer] and is on her feet most of the day.  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  Patient would like to discuss ways to get her health back under control and different medications that may assist her with her weight loss  journey.      Starting MWM weight: 174.8 lbs  Starting BMI: 29.31  Starting Waist Measurement: 39.25 in  Goal weight: 155 lbs    Obesity/Excess Weight:  Severity: Mild  Onset:  last 15-20 years   Modifiers: Diet and Exercise  Contributing factors: Poor Food Choices, Insufficient Physical Activity, Stress/Emotional Eating, Lack of knowledge of appropriate lifestyle changes, and Insufficient time to make appropriate lifestyle changes  Associated symptoms: comorbid conditions, fatigue, increased joint pain, decreased exercise capacity, body image issues, decreased self esteem, increased shortness of breath, decreased mobility, depression, inability to do certain activities, and clothes do not fit    Diet recall:  B: skips - coffee  S: (9am) cheerios OR apple with PB OR bagel with CC  L: 1/2 sandwich (cold cuts on whole wheat) OR pizza OR salad with cheese  S: no  D: salad with pasta and garlic bread OR burger OR grilled pork chops with potato OR meatloaf  S: cookie (oreo)  Take out frequency: 2 times a month    Hydration: very little water, coffee - creamer  Alcohol: occasionally  Smoking: no  Exercise: walks dog  Occupation: works in [employer] all day  Sleep: well with CPAP        The following portions of the patient's history were reviewed and updated as appropriate: allergies, current medications, past family history, past medical history, past social history, past surgical history, and problem list.    Family History   Problem Relation Age of Onset    Ovarian cancer Mother     Hypertension Father     Diabetes Father     Hyperlipidemia Father     Hypertension Brother     No Known Problems Maternal Grandmother     No Known Problems Maternal Grandfather     No Known Problems Paternal Grandmother     No Known Problems Paternal Grandfather     Breast cancer Neg Hx         Review of Systems   Constitutional:  Negative for fatigue.   HENT:  Negative for sore throat.    Respiratory:  Negative for cough and shortness of  "breath.    Cardiovascular:  Negative for chest pain, palpitations and leg swelling.   Gastrointestinal:  Negative for abdominal pain, constipation, diarrhea and nausea.   Genitourinary:  Negative for dysuria.   Musculoskeletal:  Negative for arthralgias and back pain.   Skin:  Negative for rash.   Neurological:  Negative for headaches.   Psychiatric/Behavioral:  Negative for dysphoric mood. The patient is not nervous/anxious.        Objective:  /82 (BP Location: Left arm, Patient Position: Sitting, Cuff Size: Adult)   Pulse 67   Resp 18   Ht 5' 4.75\" (1.645 m)   Wt 79.3 kg (174 lb 12.8 oz)   SpO2 96%   BMI 29.31 kg/m²     Physical Exam  Vitals and nursing note reviewed.   Constitutional:       Appearance: Normal appearance. She is obese.   HENT:      Head: Normocephalic.   Pulmonary:      Effort: Pulmonary effort is normal.   Neurological:      General: No focal deficit present.      Mental Status: She is alert and oriented to person, place, and time.   Psychiatric:         Mood and Affect: Mood normal.         Behavior: Behavior normal.         Thought Content: Thought content normal.         Judgment: Judgment normal.              Labs and Imaging  Recent labs and imaging have been personally reviewed.  Lab Results   Component Value Date    WBC 11.92 (H) 03/18/2024    HGB 14.0 03/18/2024    HCT 41.8 03/18/2024    MCV 92 03/18/2024     03/18/2024     Lab Results   Component Value Date    SODIUM 136 03/18/2024    K 3.7 03/18/2024     03/18/2024    CO2 25 03/18/2024    AGAP 9 03/18/2024    BUN 19 03/18/2024    CREATININE 1.04 03/18/2024    GLUC 104 (H) 11/09/2018    GLUF 96 03/18/2024    CALCIUM 9.8 03/18/2024    AST 21 11/09/2018    ALT 44 11/09/2018    ALKPHOS 69 11/09/2018    TP 7.4 11/09/2018    TBILI 0.6 11/09/2018    EGFR 57 03/18/2024     Lab Results   Component Value Date    HGBA1C 5.7 (H) 10/05/2023     Lab Results   Component Value Date    TSH 3.04 11/09/2018     Lab Results "   Component Value Date    CHOLESTEROL 153 10/05/2023     Lab Results   Component Value Date    HDL 30 (L) 10/05/2023     Lab Results   Component Value Date    TRIG 238 (H) 10/05/2023     Lab Results   Component Value Date    LDLCALC 75 10/05/2023

## 2024-10-17 DIAGNOSIS — E78.2 MIXED HYPERLIPIDEMIA: ICD-10-CM

## 2024-10-17 DIAGNOSIS — F41.1 ANXIETY STATE: ICD-10-CM

## 2024-10-17 DIAGNOSIS — F32.5 MAJOR DEPRESSION IN COMPLETE REMISSION (HCC): ICD-10-CM

## 2024-10-17 DIAGNOSIS — G43.909 MIGRAINE WITHOUT STATUS MIGRAINOSUS, NOT INTRACTABLE, UNSPECIFIED MIGRAINE TYPE: ICD-10-CM

## 2024-10-17 DIAGNOSIS — K21.9 GASTROESOPHAGEAL REFLUX DISEASE WITHOUT ESOPHAGITIS: ICD-10-CM

## 2024-10-17 RX ORDER — ATORVASTATIN CALCIUM 10 MG/1
10 TABLET, FILM COATED ORAL DAILY
Qty: 90 TABLET | Refills: 1 | Status: SHIPPED | OUTPATIENT
Start: 2024-10-17

## 2024-10-17 RX ORDER — OMEPRAZOLE 40 MG/1
40 CAPSULE, DELAYED RELEASE ORAL DAILY
Qty: 90 CAPSULE | Refills: 1 | Status: SHIPPED | OUTPATIENT
Start: 2024-10-17

## 2024-10-17 RX ORDER — TOPIRAMATE 50 MG/1
50 TABLET, FILM COATED ORAL DAILY
Qty: 90 TABLET | Refills: 1 | Status: SHIPPED | OUTPATIENT
Start: 2024-10-17

## 2024-10-17 RX ORDER — BUPROPION HYDROCHLORIDE 150 MG/1
150 TABLET ORAL DAILY
Qty: 90 TABLET | Refills: 1 | Status: SHIPPED | OUTPATIENT
Start: 2024-10-17

## 2024-10-23 DIAGNOSIS — E78.2 MIXED HYPERLIPIDEMIA: ICD-10-CM

## 2024-10-23 DIAGNOSIS — G47.33 OBSTRUCTIVE SLEEP APNEA SYNDROME: ICD-10-CM

## 2024-10-23 DIAGNOSIS — E66.3 OVERWEIGHT WITH BODY MASS INDEX (BMI) OF 29 TO 29.9 IN ADULT: ICD-10-CM

## 2024-10-28 ENCOUNTER — OFFICE VISIT (OUTPATIENT)
Dept: FAMILY MEDICINE CLINIC | Facility: CLINIC | Age: 64
End: 2024-10-28
Payer: COMMERCIAL

## 2024-10-28 VITALS
DIASTOLIC BLOOD PRESSURE: 78 MMHG | WEIGHT: 176.2 LBS | SYSTOLIC BLOOD PRESSURE: 132 MMHG | TEMPERATURE: 97.8 F | BODY MASS INDEX: 29.36 KG/M2 | HEIGHT: 65 IN | HEART RATE: 88 BPM | OXYGEN SATURATION: 97 %

## 2024-10-28 DIAGNOSIS — H10.33 ACUTE BACTERIAL CONJUNCTIVITIS OF BOTH EYES: Primary | ICD-10-CM

## 2024-10-28 DIAGNOSIS — J40 BRONCHITIS: ICD-10-CM

## 2024-10-28 DIAGNOSIS — N95.2 ATROPHIC VAGINITIS: ICD-10-CM

## 2024-10-28 DIAGNOSIS — Z12.31 ENCOUNTER FOR SCREENING MAMMOGRAM FOR BREAST CANCER: ICD-10-CM

## 2024-10-28 PROCEDURE — 99214 OFFICE O/P EST MOD 30 MIN: CPT | Performed by: FAMILY MEDICINE

## 2024-10-28 RX ORDER — ESTRADIOL 10 UG/1
1 INSERT VAGINAL DAILY
Qty: 30 TABLET | Refills: 5 | Status: SHIPPED | OUTPATIENT
Start: 2024-10-28

## 2024-10-28 RX ORDER — CODEINE PHOSPHATE/GUAIFENESIN 10-100MG/5
10 LIQUID (ML) ORAL 4 TIMES DAILY PRN
Qty: 240 ML | Refills: 0 | Status: SHIPPED | OUTPATIENT
Start: 2024-10-28 | End: 2024-10-29 | Stop reason: SDUPTHER

## 2024-10-28 NOTE — PATIENT INSTRUCTIONS
For conjunctivitis, she has sulfacetamide ophthalmic at home.  Suggest 2 drops both eyes 5 or 6 times a day for the first couple days, then 4 times a day until symptoms are resolved.  For cough, Robitussin with codeine 1 or 2 teaspoons every 4 hours as needed.  Pseudoephedrine from behind the counter 1 or 230 mg tablets every 4 hours for nasal congestion.  2 Tylenol 3 times a day for aches and pains.  Discussion of her urinary symptoms which are probably related to lack of hormone in the vagina.  Trial of Vagifem tablets once daily.  If improved after a week or 2, can decrease to 2 or 3 times a week.  Hoarseness will respond with time.  Best treatment is voice rest.  Prescription for mammogram  Return as scheduled.

## 2024-10-28 NOTE — PROGRESS NOTES
Chief Complaint   Patient presents with    Sinus Problem     Congestion and lost voice-Symptoms started Friday    Cough    Eye Drainage     Red and draining        HPI   Here with 3 days of cold symptoms.  Congestion.  Cough.  Eye drainage.  Lost her voice.  Has a cough productive of green phlegm.  Notes intermittent urinary symptoms.  Usually resolves with Pyridium.  Notes that she is very dry in the vagina area.  Not presently sexually active.    Past Medical History:   Diagnosis Date    Anxiety state 01/09/2015    Esophageal reflux 04/28/2014    Essential hypertension 02/26/2016    Herpes simplex 06/12/2012    Major depression in complete remission (HCC) 02/21/2014    Migraine 03/20/2015    Mixed hyperlipidemia 06/11/2010    Obstructive sleep apnea syndrome 02/26/2016    Palpitations 03/20/2015    Personal history of colonic polyps 10/20/2020    Reactive airway disease without complication 04/29/2024    Sensorineural hearing loss 10/30/2014    Trigeminal neuralgia of left side of face 08/25/2016    Vitamin D deficiency 04/29/2014        Past Surgical History:   Procedure Laterality Date    APPENDECTOMY      EXOSTECTOMY Right 2004    Fifth toe    LAPAROSCOPIC CHOLECYSTECTOMY  2006    LAPAROSCOPIC HYSTERECTOMY  2007    TONSILLECTOMY         Social History     Tobacco Use    Smoking status: Never    Smokeless tobacco: Never   Substance Use Topics    Alcohol use: Yes       Social History     Social History Narrative    Wth Kev since 8/13.  since 10/7/17.   He has 3 grown children.and 5 grandchildren.      Kev  retired in 4/22. 4 years older. Has some lung issues.    First  committed suicide after they were .    2 sons . 1 grandson born 6/21/20.    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          The following portions of the patient's history were reviewed and updated as appropriate: allergies, current medications, past family history,  "past medical history, past social history, past surgical history, and problem list.      Review of Systems       /78 (BP Location: Left arm, Patient Position: Sitting, Cuff Size: Standard)   Pulse 88   Temp 97.8 °F (36.6 °C) (Temporal)   Ht 5' 4.75\" (1.645 m)   Wt 79.9 kg (176 lb 3.2 oz)   SpO2 97%   BMI 29.55 kg/m²      Physical Exam   Both eyes are injected.  TMs are white.  Voice is hoarse.  Throat reveals no erythema.  Lungs are clear.              Current Outpatient Medications:     albuterol (PROVENTIL HFA,VENTOLIN HFA) 90 mcg/act inhaler, Inhale 2 puffs every 4 (four) hours as needed for wheezing or shortness of breath, Disp: 18 g, Rfl: 5    atenolol (TENORMIN) 50 mg tablet, Take 1 tablet (50 mg total) by mouth daily, Disp: 100 tablet, Rfl: 1    atorvastatin (LIPITOR) 10 mg tablet, Take 1 tablet (10 mg total) by mouth daily, Disp: 90 tablet, Rfl: 1    buPROPion (WELLBUTRIN XL) 150 mg 24 hr tablet, Take 1 tablet (150 mg total) by mouth daily, Disp: 90 tablet, Rfl: 1    eletriptan (RELPAX) 40 MG tablet, Take 1 tablet (40 mg total) by mouth once as needed for migraine for up to 1 dose may repeat in 2 hours if necessary, Disp: 18 tablet, Rfl: 0    estradiol (Vagifem) 10 MCG TABS vaginal tablet, Insert 1 tablet (10 mcg total) into the vagina daily, Disp: 30 tablet, Rfl: 5    FLUoxetine (PROzac) 20 mg capsule, Take 1 capsule (20 mg total) by mouth daily, Disp: 90 capsule, Rfl: 1    guaifenesin-codeine (GUAIFENESIN AC) 100-10 MG/5ML liquid, Take 10 mL by mouth 4 (four) times a day as needed for cough 1-2 tsp every 4 hours as needed for cough, Disp: 240 mL, Rfl: 0    ipratropium-albuterol (DUO-NEB) 0.5-2.5 mg/3 mL nebulizer solution, Take 3 mL by nebulization 4 (four) times a day, Disp: 60 mL, Rfl: 4    omeprazole (PriLOSEC) 40 MG capsule, Take 1 capsule (40 mg total) by mouth daily, Disp: 90 capsule, Rfl: 1    tirzepatide (Zepbound) 2.5 mg/0.5 mL auto-injector, Inject 0.5 mL (2.5 mg total) under the skin " once a week for 28 days, Disp: 2 mL, Rfl: 0    topiramate (TOPAMAX) 50 MG tablet, Take 1 tablet (50 mg total) by mouth daily, Disp: 90 tablet, Rfl: 1    valACYclovir (VALTREX) 1,000 mg tablet, Take 1 tablet (1,000 mg total) by mouth 2 (two) times a day for 1 day, Disp: 28 tablet, Rfl: 3     No problem-specific Assessment & Plan notes found for this encounter.       Diagnoses and all orders for this visit:    Acute bacterial conjunctivitis of both eyes    Bronchitis  -     guaifenesin-codeine (GUAIFENESIN AC) 100-10 MG/5ML liquid; Take 10 mL by mouth 4 (four) times a day as needed for cough 1-2 tsp every 4 hours as needed for cough  -     estradiol (Vagifem) 10 MCG TABS vaginal tablet; Insert 1 tablet (10 mcg total) into the vagina daily    Encounter for screening mammogram for breast cancer  -     Mammo screening bilateral w 3d and cad; Future    Atrophic vaginitis        Patient Instructions   For conjunctivitis, she has sulfacetamide ophthalmic at home.  Suggest 2 drops both eyes 5 or 6 times a day for the first couple days, then 4 times a day until symptoms are resolved.  For cough, Robitussin with codeine 1 or 2 teaspoons every 4 hours as needed.  Pseudoephedrine from behind the counter 1 or 230 mg tablets every 4 hours for nasal congestion.  2 Tylenol 3 times a day for aches and pains.  Discussion of her urinary symptoms which are probably related to lack of hormone in the vagina.  Trial of Vagifem tablets once daily.  If improved after a week or 2, can decrease to 2 or 3 times a week.  Hoarseness will respond with time.  Best treatment is voice rest.  Prescription for mammogram  Return as scheduled.

## 2024-10-29 ENCOUNTER — TELEPHONE (OUTPATIENT)
Age: 64
End: 2024-10-29

## 2024-10-29 DIAGNOSIS — J40 BRONCHITIS: ICD-10-CM

## 2024-10-29 RX ORDER — CODEINE PHOSPHATE/GUAIFENESIN 10-100MG/5
10 LIQUID (ML) ORAL 4 TIMES DAILY PRN
Qty: 240 ML | Refills: 0 | Status: SHIPPED | OUTPATIENT
Start: 2024-10-29

## 2024-10-29 NOTE — TELEPHONE ENCOUNTER
Patient states the pharmacy would need a PA or authorization of some sort. Asking for further advice.

## 2024-10-29 NOTE — TELEPHONE ENCOUNTER
Called and spoke with pt to try and get more information. Pt stated pharmacy said there is either unclear or missing information for Guaifenesin, so they cannot proceed with authorizing medication, will route to provider for further assistance

## 2024-10-30 ENCOUNTER — TELEPHONE (OUTPATIENT)
Dept: BARIATRICS | Facility: CLINIC | Age: 64
End: 2024-10-30

## 2024-10-30 NOTE — TELEPHONE ENCOUNTER
Spoke with pt and let her know medication was resent to pharmacy. Pt verbalized understanding, no further questions.

## 2024-11-04 ENCOUNTER — TELEPHONE (OUTPATIENT)
Age: 64
End: 2024-11-04

## 2024-11-04 RX ORDER — TIRZEPATIDE 2.5 MG/.5ML
2.5 INJECTION, SOLUTION SUBCUTANEOUS WEEKLY
Qty: 2 ML | Refills: 0 | Status: SHIPPED | OUTPATIENT
Start: 2024-11-04 | End: 2024-12-02

## 2024-11-04 NOTE — TELEPHONE ENCOUNTER
Called patient back and asked her to pls stop by to drop off the FMLA form since we don't have that in the office, she expressed understanding and said she would do just that.

## 2024-11-04 NOTE — TELEPHONE ENCOUNTER
Patient calling in and asking for Zepbound to go [facility]     Please resend     Patient asking for a call back when medication is sent to pharmacy

## 2024-11-04 NOTE — TELEPHONE ENCOUNTER
Patient is calling to ask for an new medical certification for her intermittent FMLA that is used for her breathing problems. Patient stated she can come in the office to pick it up. Patient would like a return call with an updated on this.      Please advise.

## 2024-11-05 ENCOUNTER — TELEPHONE (OUTPATIENT)
Dept: BARIATRICS | Facility: CLINIC | Age: 64
End: 2024-11-05

## 2024-11-05 NOTE — TELEPHONE ENCOUNTER
Zepbound Approved 11/5/24-11/5/25.  Pharmacy was notified and they're getting the medication ready for the patient.  Patient co-pay $24.99.  Patient notified via [employer].

## 2024-11-12 ENCOUNTER — TELEPHONE (OUTPATIENT)
Dept: PULMONOLOGY | Facility: CLINIC | Age: 64
End: 2024-11-12

## 2024-11-12 NOTE — TELEPHONE ENCOUNTER
Called patient to schedule appointment for the 4M FU from the Recall List and left a voicemail message.

## 2024-11-21 DIAGNOSIS — J40 BRONCHITIS: ICD-10-CM

## 2024-11-22 RX ORDER — ESTRADIOL 10 UG/1
TABLET VAGINAL
Qty: 90 TABLET | Refills: 2 | Status: SHIPPED | OUTPATIENT
Start: 2024-11-22

## 2024-12-10 DIAGNOSIS — G47.33 OBSTRUCTIVE SLEEP APNEA SYNDROME: ICD-10-CM

## 2024-12-10 DIAGNOSIS — E78.2 MIXED HYPERLIPIDEMIA: ICD-10-CM

## 2024-12-10 DIAGNOSIS — E66.3 OVERWEIGHT WITH BODY MASS INDEX (BMI) OF 29 TO 29.9 IN ADULT: ICD-10-CM

## 2024-12-10 DIAGNOSIS — E66.3 OVERWEIGHT WITH BODY MASS INDEX (BMI) OF 29 TO 29.9 IN ADULT: Primary | ICD-10-CM

## 2024-12-10 RX ORDER — TIRZEPATIDE 5 MG/.5ML
5 INJECTION, SOLUTION SUBCUTANEOUS WEEKLY
Qty: 2 ML | Refills: 1 | Status: SHIPPED | OUTPATIENT
Start: 2024-12-10 | End: 2025-02-04

## 2024-12-10 RX ORDER — TIRZEPATIDE 2.5 MG/.5ML
2.5 INJECTION, SOLUTION SUBCUTANEOUS WEEKLY
Qty: 2 ML | Refills: 0 | Status: SHIPPED | OUTPATIENT
Start: 2024-12-10 | End: 2024-12-10 | Stop reason: DRUGHIGH

## 2024-12-27 DIAGNOSIS — E66.3 OVERWEIGHT WITH BODY MASS INDEX (BMI) OF 29 TO 29.9 IN ADULT: ICD-10-CM

## 2024-12-30 RX ORDER — TIRZEPATIDE 5 MG/.5ML
5 INJECTION, SOLUTION SUBCUTANEOUS WEEKLY
Qty: 2 ML | Refills: 0 | OUTPATIENT
Start: 2024-12-30 | End: 2025-02-24

## 2025-01-17 DIAGNOSIS — E66.3 OVERWEIGHT WITH BODY MASS INDEX (BMI) OF 29 TO 29.9 IN ADULT: ICD-10-CM

## 2025-01-20 RX ORDER — TIRZEPATIDE 5 MG/.5ML
5 INJECTION, SOLUTION SUBCUTANEOUS WEEKLY
Qty: 2 ML | Refills: 0 | Status: SHIPPED | OUTPATIENT
Start: 2025-01-20 | End: 2025-03-17

## 2025-01-29 DIAGNOSIS — G43.909 MIGRAINE WITHOUT STATUS MIGRAINOSUS, NOT INTRACTABLE, UNSPECIFIED MIGRAINE TYPE: ICD-10-CM

## 2025-01-29 DIAGNOSIS — F32.5 MAJOR DEPRESSION IN COMPLETE REMISSION (HCC): ICD-10-CM

## 2025-01-29 DIAGNOSIS — I10 ESSENTIAL HYPERTENSION: ICD-10-CM

## 2025-01-29 DIAGNOSIS — F41.1 ANXIETY STATE: ICD-10-CM

## 2025-01-29 DIAGNOSIS — E78.2 MIXED HYPERLIPIDEMIA: ICD-10-CM

## 2025-01-29 DIAGNOSIS — K21.9 GASTROESOPHAGEAL REFLUX DISEASE WITHOUT ESOPHAGITIS: ICD-10-CM

## 2025-01-29 RX ORDER — ATORVASTATIN CALCIUM 10 MG/1
10 TABLET, FILM COATED ORAL DAILY
Qty: 100 TABLET | Refills: 3 | Status: SHIPPED | OUTPATIENT
Start: 2025-01-29

## 2025-01-29 RX ORDER — OMEPRAZOLE 40 MG/1
40 CAPSULE, DELAYED RELEASE ORAL DAILY
Qty: 90 CAPSULE | Refills: 1 | Status: SHIPPED | OUTPATIENT
Start: 2025-01-29

## 2025-01-29 RX ORDER — BUPROPION HYDROCHLORIDE 150 MG/1
150 TABLET ORAL DAILY
Qty: 90 TABLET | Refills: 1 | Status: SHIPPED | OUTPATIENT
Start: 2025-01-29

## 2025-01-29 RX ORDER — ATENOLOL 50 MG/1
50 TABLET ORAL DAILY
Qty: 100 TABLET | Refills: 1 | Status: SHIPPED | OUTPATIENT
Start: 2025-01-29 | End: 2025-02-03

## 2025-01-29 RX ORDER — TOPIRAMATE 50 MG/1
50 TABLET, FILM COATED ORAL DAILY
Qty: 90 TABLET | Refills: 1 | Status: SHIPPED | OUTPATIENT
Start: 2025-01-29

## 2025-01-30 ENCOUNTER — OFFICE VISIT (OUTPATIENT)
Dept: BARIATRICS | Facility: CLINIC | Age: 65
End: 2025-01-30
Payer: COMMERCIAL

## 2025-01-30 VITALS
DIASTOLIC BLOOD PRESSURE: 64 MMHG | WEIGHT: 161.2 LBS | BODY MASS INDEX: 26.86 KG/M2 | OXYGEN SATURATION: 96 % | HEIGHT: 65 IN | SYSTOLIC BLOOD PRESSURE: 106 MMHG | HEART RATE: 97 BPM

## 2025-01-30 DIAGNOSIS — E66.3 OVERWEIGHT WITH BODY MASS INDEX (BMI) OF 29 TO 29.9 IN ADULT: ICD-10-CM

## 2025-01-30 PROCEDURE — 99214 OFFICE O/P EST MOD 30 MIN: CPT | Performed by: NURSE PRACTITIONER

## 2025-01-30 RX ORDER — TIRZEPATIDE 5 MG/.5ML
5 INJECTION, SOLUTION SUBCUTANEOUS WEEKLY
Qty: 2 ML | Refills: 1 | Status: SHIPPED | OUTPATIENT
Start: 2025-01-30 | End: 2025-03-27

## 2025-01-30 NOTE — ASSESSMENT & PLAN NOTE
- Patient is pursuing Conservative Program and follow up visits with medical weight management provider  - Initial weight loss goal of 5-10% weight loss for improved health. Weight loss can improve patient's co-morbid conditions and/or prevent weight-related complications.  - Explained the importance of continuing lifestyle changes in addition to any anti-obesity medications.   - Labs reviewed from 10/2023 - has updated labs with PCP scheduled    General Recommendations:  Nutrition:  Eat breakfast daily.  Do not skip meals.      Food log (ie.) www.Who-Sells-it.com.com, sparkpeople.com, loseit.com, calorieking.com, etc.     Practice mindful eating.  Be sure to set aside time to eat, eat slowly, and savor your food.     Hydration:    At least 64oz of water daily.  No sugar sweetened beverages.  No juice (eat the fruit instead).     Exercise:  Studies have shown that the ideal exercise goal is somewhere between 150 to 300 minutes of moderate intensity exercise a week.  Start with exercising 10 minutes every other day and gradually increase physical activity with a goal of at least 150 minutes of moderate intensity exercise a week, divided over at least 3 days a week.  An example of this would be exercising 30 minutes a day, 5 days a week.  Resistance training can increase muscle mass and increase our resting metabolic rate.   FULL BODY resistance training is recommended 2-3 times a week.  Do not do this on consecutive days to allow for muscle recovery.     Aim for a bare minimum 5000 steps, even on days you do not exercise.     Monitoring:   Weigh yourself daily.  If this causes undue stress, then just weigh yourself once a week.  Weigh yourself the same time of the day with the same amount of clothing on.  Preferably this should be done after waking up, before you eat, and with no clothing or minimal clothing on.     Specific Goals:  Calorie goal:  0739-4199 subhash/day   Patient lifestyle habits were reviewed.  Nutrition was  discussed and she will continue with her balanced nutrition and smaller portion.  Medications were discussed and she will continue on Zepbound as she is seeing good weight loss success and tolerating without any side effects.  She will continue on 5 mg weekly.  She will follow-up in the office in 3 months to monitor her weight loss journey.

## 2025-01-30 NOTE — PROGRESS NOTES
Assessment/Plan:     Overweight with body mass index (BMI) of 29 to 29.9 in adult  - Patient is pursuing Conservative Program and follow up visits with medical weight management provider  - Initial weight loss goal of 5-10% weight loss for improved health. Weight loss can improve patient's co-morbid conditions and/or prevent weight-related complications.  - Explained the importance of continuing lifestyle changes in addition to any anti-obesity medications.   - Labs reviewed from 10/2023 - has updated labs with PCP scheduled    General Recommendations:  Nutrition:  Eat breakfast daily.  Do not skip meals.      Food log (ie.) www.myfitnesspal.com, sparkpeople.com, loseit.com, MineWhat.com, etc.     Practice mindful eating.  Be sure to set aside time to eat, eat slowly, and savor your food.     Hydration:    At least 64oz of water daily.  No sugar sweetened beverages.  No juice (eat the fruit instead).     Exercise:  Studies have shown that the ideal exercise goal is somewhere between 150 to 300 minutes of moderate intensity exercise a week.  Start with exercising 10 minutes every other day and gradually increase physical activity with a goal of at least 150 minutes of moderate intensity exercise a week, divided over at least 3 days a week.  An example of this would be exercising 30 minutes a day, 5 days a week.  Resistance training can increase muscle mass and increase our resting metabolic rate.   FULL BODY resistance training is recommended 2-3 times a week.  Do not do this on consecutive days to allow for muscle recovery.     Aim for a bare minimum 5000 steps, even on days you do not exercise.     Monitoring:   Weigh yourself daily.  If this causes undue stress, then just weigh yourself once a week.  Weigh yourself the same time of the day with the same amount of clothing on.  Preferably this should be done after waking up, before you eat, and with no clothing or minimal clothing on.     Specific  Goals:  Calorie goal:  6514-3882 subhash/day   Patient lifestyle habits were reviewed.  Nutrition was discussed and she will continue with her balanced nutrition and smaller portion.  Medications were discussed and she will continue on Zepbound as she is seeing good weight loss success and tolerating without any side effects.  She will continue on 5 mg weekly.  She will follow-up in the office in 3 months to monitor her weight loss journey.         Barry was seen today for follow-up.    Diagnoses and all orders for this visit:    Overweight with body mass index (BMI) of 29 to 29.9 in adult  -     tirzepatide (Zepbound) 5 mg/0.5 mL auto-injector; Inject 0.5 mL (5 mg total) under the skin once a week        Total time spent reviewing chart, interviewing patient, examining patient, discussing plan, answering all questions, and documentin minutes with >50% face-to-face time with the patient.    Follow up in approximately 3 months with Non-Surgical Physician/Advanced Practitioner.    Subjective:   Chief Complaint   Patient presents with    Follow-up     Pt here today for MWM f/u        Patient ID: Barry Travis  is a 64 y.o. female with excess weight/obesity here to pursue weight management.  Patient is pursuing Conservative Program.   Most recent notes and records were reviewed.    HPI    Wt Readings from Last 20 Encounters:   25 73.1 kg (161 lb 3.2 oz)   10/28/24 79.9 kg (176 lb 3.2 oz)   10/15/24 79.3 kg (174 lb 12.8 oz)   24 79.8 kg (176 lb)   24 78.2 kg (172 lb 6.4 oz)   24 79.3 kg (174 lb 12.8 oz)   24 78 kg (172 lb)   24 79.8 kg (176 lb)   24 78.9 kg (174 lb)   10/13/23 77.4 kg (170 lb 9.6 oz)   23 78.6 kg (173 lb 3.2 oz)   23 75.3 kg (166 lb)   22 75.3 kg (166 lb)   22 75.8 kg (167 lb)   22 74.4 kg (164 lb)   20 75.3 kg (166 lb)   10/20/20 74.7 kg (164 lb 9.6 oz)   10/05/20 73.5 kg (162 lb)   20 74.3 kg (163 lb 14.4 oz)    09/14/20 73.9 kg (163 lb)       Patient presents today to medical weight management office for follow up.  Patient was started on Zepbound at last office visit is tolerating the 5 mg dose without any side effects.  She is staying consistent with her nutrition and was not able to meet with a dietitian but would like to in the future.  She is focusing on protein and balancing her calories throughout the day.  She is utilizing protein shakes in the morning.  Patient does not have any formal exercise but he is active at work and on her feet most of the day.  Patient is very happy with her progress and wishes to continue with this therapy to hopefully improve her sleep apnea as well as her overall health.      Weight loss medication and dose: Zepbound 5mg  Started weight and date: 174.8 lbs in 10/2024  Current weight: 161.2 lbs  Difference: -13.6 lbs  Percentage of weight loss: -7.7%  Goal weight: 155 lbs    Starting BMI: 29.31 in 10/2024  Current BMI: 27.03    Waist Measurements:  10/2024: 39.25 in  1/2025: 38.5 in      Diet recall:  B: eggs and 1/2 protein shake  L: fruit OR cheese  S: pretzels with PB OR fruit  D: fish and vegetables/salad  S: cookie (oreo)  Take out frequency: 2 times a month    Hydration: very little water, coffee - creamer  Alcohol: occasionally  Smoking: no  Exercise: walks dog - walking at work  Occupation: works in [employer] walks all day  Sleep: well with CPAP      The following portions of the patient's history were reviewed and updated as appropriate: allergies, current medications, past family history, past medical history, past social history, past surgical history, and problem list.    Family History   Problem Relation Age of Onset    Ovarian cancer Mother     Hypertension Father     Diabetes Father     Hyperlipidemia Father     Hypertension Brother     No Known Problems Maternal Grandmother     No Known Problems Maternal Grandfather     No Known Problems Paternal Grandmother     No Known  "Problems Paternal Grandfather     Breast cancer Neg Hx         Review of Systems   Constitutional:  Negative for fatigue.   HENT:  Negative for sore throat.    Respiratory:  Negative for cough and shortness of breath.    Cardiovascular:  Negative for chest pain, palpitations and leg swelling.   Gastrointestinal:  Negative for abdominal pain, constipation, diarrhea and nausea.   Genitourinary:  Negative for dysuria.   Musculoskeletal:  Negative for arthralgias and back pain.   Skin:  Negative for rash.   Neurological:  Negative for headaches.   Psychiatric/Behavioral:  Negative for dysphoric mood. The patient is not nervous/anxious.        Objective:  /64 (BP Location: Left arm, Patient Position: Sitting, Cuff Size: Standard)   Pulse 97   Ht 5' 4.75\" (1.645 m)   Wt 73.1 kg (161 lb 3.2 oz)   SpO2 96%   BMI 27.03 kg/m²     Physical Exam  Vitals and nursing note reviewed.   Constitutional:       Appearance: Normal appearance. She is obese.   HENT:      Head: Normocephalic.   Pulmonary:      Effort: Pulmonary effort is normal.   Neurological:      General: No focal deficit present.      Mental Status: She is alert and oriented to person, place, and time.   Psychiatric:         Mood and Affect: Mood normal.         Behavior: Behavior normal.         Thought Content: Thought content normal.         Judgment: Judgment normal.            Labs   Most recent labs reviewed   Lab Results   Component Value Date    SODIUM 136 03/18/2024    K 3.7 03/18/2024     03/18/2024    CO2 25 03/18/2024    AGAP 9 03/18/2024    BUN 19 03/18/2024    CREATININE 1.04 03/18/2024    GLUC 104 (H) 11/09/2018    GLUF 96 03/18/2024    CALCIUM 9.8 03/18/2024    AST 21 11/09/2018    ALT 44 11/09/2018    ALKPHOS 69 11/09/2018    TP 7.4 11/09/2018    TBILI 0.6 11/09/2018    EGFR 57 03/18/2024     Lab Results   Component Value Date    HGBA1C 5.7 (H) 10/05/2023     Lab Results   Component Value Date    TSH 3.04 11/09/2018     Lab Results "   Component Value Date    CHOLESTEROL 153 10/05/2023     Lab Results   Component Value Date    HDL 30 (L) 10/05/2023     Lab Results   Component Value Date    TRIG 238 (H) 10/05/2023     Lab Results   Component Value Date    LDLCALC 75 10/05/2023

## 2025-02-02 DIAGNOSIS — E66.3 OVERWEIGHT WITH BODY MASS INDEX (BMI) OF 29 TO 29.9 IN ADULT: ICD-10-CM

## 2025-02-02 DIAGNOSIS — I10 ESSENTIAL HYPERTENSION: ICD-10-CM

## 2025-02-03 RX ORDER — ATENOLOL 50 MG/1
50 TABLET ORAL DAILY
Qty: 90 TABLET | Refills: 1 | Status: SHIPPED | OUTPATIENT
Start: 2025-02-03

## 2025-02-04 RX ORDER — TIRZEPATIDE 5 MG/.5ML
5 INJECTION, SOLUTION SUBCUTANEOUS WEEKLY
Qty: 2 ML | Refills: 0 | OUTPATIENT
Start: 2025-02-04 | End: 2025-04-01

## 2025-02-25 DIAGNOSIS — E66.3 OVERWEIGHT WITH BODY MASS INDEX (BMI) OF 29 TO 29.9 IN ADULT: ICD-10-CM

## 2025-02-26 RX ORDER — TIRZEPATIDE 5 MG/.5ML
5 INJECTION, SOLUTION SUBCUTANEOUS WEEKLY
Qty: 2 ML | Refills: 0 | Status: SHIPPED | OUTPATIENT
Start: 2025-02-26 | End: 2025-04-23

## 2025-02-27 ENCOUNTER — OFFICE VISIT (OUTPATIENT)
Dept: OBGYN CLINIC | Facility: MEDICAL CENTER | Age: 65
End: 2025-02-27
Payer: COMMERCIAL

## 2025-02-27 VITALS
DIASTOLIC BLOOD PRESSURE: 60 MMHG | WEIGHT: 158 LBS | SYSTOLIC BLOOD PRESSURE: 112 MMHG | BODY MASS INDEX: 26.98 KG/M2 | HEIGHT: 64 IN

## 2025-02-27 DIAGNOSIS — Z01.419 ENCOUNTER FOR GYNECOLOGICAL EXAMINATION: Primary | ICD-10-CM

## 2025-02-27 DIAGNOSIS — N95.2 VAGINAL ATROPHY: ICD-10-CM

## 2025-02-27 PROCEDURE — S0610 ANNUAL GYNECOLOGICAL EXAMINA: HCPCS | Performed by: OBSTETRICS & GYNECOLOGY

## 2025-02-27 RX ORDER — ESTRADIOL 0.1 MG/G
0.5 CREAM VAGINAL 2 TIMES WEEKLY
Qty: 42.5 G | Refills: 1 | Status: SHIPPED | OUTPATIENT
Start: 2025-02-27

## 2025-02-27 NOTE — PROGRESS NOTES
ASSESSMENT & PLAN: Barry Travis is a 64 y.o.  with normal gynecologic exam.    1.  Routine well woman exam done today  2.  Pap and HPV:  The patient's last pap and hpv was year ago .    It was normal.    Pap with cotesting was not done today.    Current ASCCP Guidelines reviewed. Hysterectomy  in   3.  Mammogram scheduled   4.  Colorectal cancer screening was not ordered.  5. The following were reviewed in today's visit: breast self exam, use and side effects of HRT, menopause, exercise, and healthy diet  6. Vaginal atrophy and  vulvar irritation  - we discussed risk and benefit of  initiation  of  estrace cream / all questions answered      CC:  Annual Gynecologic Examination    HPI: Barry Travis is a 64 y.o.  who presents for annual gynecologic examination.  She has the following concerns:  vulvar irritation and  vaginal  irritation and  itching / discomfort with intercourse      Health Maintenance:    She wears her seatbelt routinely.    She does perform regular monthly self breast exams.    She feels safe at home.       Past Medical History:   Diagnosis Date    Anxiety state 2015    Esophageal reflux 2014    Essential hypertension 2016    Herpes simplex 2012    Major depression in complete remission (HCC) 2014    Migraine 2015    Mixed hyperlipidemia 2010    Obstructive sleep apnea syndrome 2016    Palpitations 2015    Personal history of colonic polyps 10/20/2020    Reactive airway disease without complication 2024    Sensorineural hearing loss 10/30/2014    Trigeminal neuralgia of left side of face 2016    Vitamin D deficiency 2014       Past Surgical History:   Procedure Laterality Date    APPENDECTOMY      EXOSTECTOMY Right 2004    Fifth toe    LAPAROSCOPIC CHOLECYSTECTOMY  2006    LAPAROSCOPIC HYSTERECTOMY  2007    TONSILLECTOMY         Past OB/Gyn History:  OB History          3    Para   3    Term   3             AB        Living   2         SAB        IAB        Ectopic        Multiple        Live Births   3                   Family History   Problem Relation Age of Onset    Ovarian cancer Mother     Hypertension Father     Diabetes Father     Hyperlipidemia Father     Hypertension Brother     No Known Problems Maternal Grandmother     No Known Problems Maternal Grandfather     No Known Problems Paternal Grandmother     No Known Problems Paternal Grandfather     Breast cancer Neg Hx        Social History:  Social History     Socioeconomic History    Marital status: /Civil Union     Spouse name: Not on file    Number of children: Not on file    Years of education: Not on file    Highest education level: Not on file   Occupational History    Not on file   Tobacco Use    Smoking status: Never    Smokeless tobacco: Never   Vaping Use    Vaping status: Never Used   Substance and Sexual Activity    Alcohol use: Yes     Alcohol/week: 2.0 standard drinks of alcohol     Types: 2 Cans of beer per week    Drug use: Never    Sexual activity: Not Currently     Partners: Male     Birth control/protection: Female Sterilization   Other Topics Concern    Not on file   Social History Narrative    Wth Kev since .  since 10/7/17.   He has 3 grown children.and 5 grandchildren.      Kev  retired in . 4 years older. Has some lung issues.    First  committed suicide after they were .    2 sons . 1 grandson born 20.    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       Social Drivers of Health     Financial Resource Strain: Not on file   Food Insecurity: Not on file   Transportation Needs: Not on file   Physical Activity: Not on file   Stress: Not on file   Social Connections: Not on file   Intimate Partner Violence: Not on file   Housing Stability: Not on file     No Known Allergies    Current Outpatient Medications:     albuterol  (PROVENTIL HFA,VENTOLIN HFA) 90 mcg/act inhaler, Inhale 2 puffs every 4 (four) hours as needed for wheezing or shortness of breath, Disp: 18 g, Rfl: 5    atenolol (TENORMIN) 50 mg tablet, TAKE 1 TABLET BY MOUTH EVERY DAY, Disp: 90 tablet, Rfl: 1    atorvastatin (LIPITOR) 10 mg tablet, Take 1 tablet (10 mg total) by mouth daily, Disp: 100 tablet, Rfl: 3    buPROPion (WELLBUTRIN XL) 150 mg 24 hr tablet, Take 1 tablet (150 mg total) by mouth daily, Disp: 90 tablet, Rfl: 1    estradiol (ESTRACE VAGINAL) 0.1 mg/g vaginal cream, Insert 0.5 g into the vagina 2 (two) times a week First week of use please use every day followed by 2 times weekly, Disp: 42.5 g, Rfl: 1    FLUoxetine (PROzac) 20 mg capsule, Take 1 capsule (20 mg total) by mouth daily, Disp: 90 capsule, Rfl: 1    ipratropium-albuterol (DUO-NEB) 0.5-2.5 mg/3 mL nebulizer solution, Take 3 mL by nebulization 4 (four) times a day, Disp: 60 mL, Rfl: 4    omeprazole (PriLOSEC) 40 MG capsule, Take 1 capsule (40 mg total) by mouth daily, Disp: 90 capsule, Rfl: 1    tirzepatide (Zepbound) 5 mg/0.5 mL auto-injector, Inject 0.5 mL (5 mg total) under the skin once a week, Disp: 2 mL, Rfl: 0    topiramate (TOPAMAX) 50 MG tablet, Take 1 tablet (50 mg total) by mouth daily, Disp: 90 tablet, Rfl: 1    eletriptan (RELPAX) 40 MG tablet, Take 1 tablet (40 mg total) by mouth once as needed for migraine for up to 1 dose may repeat in 2 hours if necessary (Patient not taking: Reported on 1/30/2025), Disp: 18 tablet, Rfl: 0    guaifenesin-codeine (GUAIFENESIN AC) 100-10 MG/5ML liquid, Take 10 mL by mouth 4 (four) times a day as needed for cough (Patient not taking: Reported on 2/27/2025), Disp: 240 mL, Rfl: 0    valACYclovir (VALTREX) 1,000 mg tablet, Take 1 tablet (1,000 mg total) by mouth 2 (two) times a day for 1 day (Patient not taking: Reported on 1/30/2025), Disp: 28 tablet, Rfl: 3      Review of Systems  Constitutional :no fever, feels well, no tiredness, no recent weight gain  "or loss  ENT: no ear ache, no loss of hearing, no nosebleeds or nasal discharge, no sore throat or hoarseness.  Cardiovascular: no complaints of slow or fast heart beat, no chest pain, no palpitations, no leg claudication or lower extremity edema.  Respiratory: no complaints of shortness of shortness of breath, no MAZARIEGOS  Breasts:no complaints of breast pain, breast lump, or nipple discharge  Gastrointestinal: no complaints of abdominal pain, constipation, nausea, vomiting, or diarrhea or bloody stools  Genitourinary : as noted in HPI.  Musculoskeletal: no complaints of arthralgia, no myalgia, no joint swelling or stiffness, no limb pain or swelling.  Integumentary: no complaints of skin rash or lesion, itching or dry skin  Neurological: no complaints of headache, no confusion, no numbness or tingling, no dizziness or fainting    Objective      /60   Ht 5' 4\" (1.626 m)   Wt 71.7 kg (158 lb)   BMI 27.12 kg/m²     General:   appears stated age, cooperative, alert normal mood and affect   Lungs: Unlabored     Breasts: normal appearance, no masses or tenderness, Inspection negative, No nipple retraction or dimpling, No nipple discharge or bleeding, No axillary or supraclavicular adenopathy, Normal to palpation without dominant masses   Abdomen: soft, non-tender, without masses or organomegaly   Vulva: normal, normal female genitalia, Bartholin's, Urethra, [place] normal, no lesions, normal female hair distribution, no clitoral enlargement   Vagina: vagina positive for atrophic mucosa   Urethra: normal   Cervix: Absent.   Uterus: uterus absent   Adnexa: surgically absent and no mass, fullness, tenderness   Psychiatric orientation to person, place, and time: normal. mood and affect: normal            "

## 2025-03-21 DIAGNOSIS — N95.2 VAGINAL ATROPHY: ICD-10-CM

## 2025-03-21 DIAGNOSIS — E66.3 OVERWEIGHT WITH BODY MASS INDEX (BMI) OF 29 TO 29.9 IN ADULT: ICD-10-CM

## 2025-03-21 RX ORDER — ESTRADIOL 0.1 MG/G
0.5 CREAM VAGINAL 2 TIMES WEEKLY
Qty: 42.5 G | Refills: 0 | Status: SHIPPED | OUTPATIENT
Start: 2025-03-24

## 2025-03-21 RX ORDER — TIRZEPATIDE 5 MG/.5ML
5 INJECTION, SOLUTION SUBCUTANEOUS WEEKLY
Qty: 2 ML | Refills: 0 | Status: SHIPPED | OUTPATIENT
Start: 2025-03-21 | End: 2025-05-16

## 2025-03-24 ENCOUNTER — APPOINTMENT (EMERGENCY)
Dept: RADIOLOGY | Facility: HOSPITAL | Age: 65
End: 2025-03-24
Payer: COMMERCIAL

## 2025-03-24 ENCOUNTER — APPOINTMENT (EMERGENCY)
Dept: CT IMAGING | Facility: HOSPITAL | Age: 65
End: 2025-03-24
Payer: OTHER MISCELLANEOUS

## 2025-03-24 ENCOUNTER — HOSPITAL ENCOUNTER (EMERGENCY)
Facility: HOSPITAL | Age: 65
Discharge: HOME | End: 2025-03-24
Attending: SURGERY
Payer: COMMERCIAL

## 2025-03-24 ENCOUNTER — APPOINTMENT (EMERGENCY)
Dept: RADIOLOGY | Facility: HOSPITAL | Age: 65
End: 2025-03-24
Payer: OTHER MISCELLANEOUS

## 2025-03-24 ENCOUNTER — HOSPITAL ENCOUNTER (EMERGENCY)
Facility: HOSPITAL | Age: 65
End: 2025-03-24
Attending: EMERGENCY MEDICINE
Payer: OTHER MISCELLANEOUS

## 2025-03-24 VITALS
HEART RATE: 62 BPM | DIASTOLIC BLOOD PRESSURE: 62 MMHG | SYSTOLIC BLOOD PRESSURE: 124 MMHG | RESPIRATION RATE: 18 BRPM | TEMPERATURE: 97.6 F | OXYGEN SATURATION: 97 %

## 2025-03-24 VITALS
SYSTOLIC BLOOD PRESSURE: 118 MMHG | DIASTOLIC BLOOD PRESSURE: 72 MMHG | OXYGEN SATURATION: 97 % | TEMPERATURE: 97.6 F | RESPIRATION RATE: 20 BRPM | HEART RATE: 62 BPM
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
## 2025-03-24 DIAGNOSIS — R93.7 ABNORMAL CT SCAN, CERVICAL SPINE: ICD-10-CM

## 2025-03-24 DIAGNOSIS — R40.20 LOSS OF CONSCIOUSNESS (HCC): ICD-10-CM

## 2025-03-24 DIAGNOSIS — T71.193A ASSAULT BY MANUAL STRANGULATION: ICD-10-CM

## 2025-03-24 DIAGNOSIS — S14.109A INJURY OF CERVICAL SPINE, INITIAL ENCOUNTER (HCC): Primary | ICD-10-CM

## 2025-03-24 LAB
ANION GAP SERPL CALCULATED.3IONS-SCNC: 8 MMOL/L (ref 4–13)
APTT PPP: 28 SECONDS (ref 23–34)
ATRIAL RATE: 63 BPM
BASOPHILS # BLD AUTO: 0.03 THOUSANDS/ÂΜL (ref 0–0.1)
BASOPHILS NFR BLD AUTO: 0 % (ref 0–1)
BUN SERPL-MCNC: 24 MG/DL (ref 5–25)
CALCIUM SERPL-MCNC: 10 MG/DL (ref 8.4–10.2)
CHLORIDE SERPL-SCNC: 108 MMOL/L (ref 96–108)
CO2 SERPL-SCNC: 25 MMOL/L (ref 21–32)
CREAT SERPL-MCNC: 0.85 MG/DL (ref 0.6–1.3)
EOSINOPHIL # BLD AUTO: 0.38 THOUSAND/ÂΜL (ref 0–0.61)
EOSINOPHIL NFR BLD AUTO: 5 % (ref 0–6)
ERYTHROCYTE [DISTWIDTH] IN BLOOD BY AUTOMATED COUNT: 12.5 % (ref 11.6–15.1)
GFR SERPL CREATININE-BSD FRML MDRD: 73 ML/MIN/1.73SQ M
GLUCOSE SERPL-MCNC: 85 MG/DL (ref 65–140)
GLUCOSE SERPL-MCNC: 92 MG/DL (ref 65–140)
HCT VFR BLD AUTO: 41.7 % (ref 34.8–46.1)
HGB BLD-MCNC: 13.9 G/DL (ref 11.5–15.4)
IMM GRANULOCYTES # BLD AUTO: 0.03 THOUSAND/UL (ref 0–0.2)
IMM GRANULOCYTES NFR BLD AUTO: 0 % (ref 0–2)
INR PPP: 1.09 (ref 0.85–1.19)
LYMPHOCYTES # BLD AUTO: 2.73 THOUSANDS/ÂΜL (ref 0.6–4.47)
LYMPHOCYTES NFR BLD AUTO: 39 % (ref 14–44)
MCH RBC QN AUTO: 30.7 PG (ref 26.8–34.3)
MCHC RBC AUTO-ENTMCNC: 33.3 G/DL (ref 31.4–37.4)
MCV RBC AUTO: 92 FL (ref 82–98)
MONOCYTES # BLD AUTO: 0.42 THOUSAND/ÂΜL (ref 0.17–1.22)
MONOCYTES NFR BLD AUTO: 6 % (ref 4–12)
NEUTROPHILS # BLD AUTO: 3.43 THOUSANDS/ÂΜL (ref 1.85–7.62)
NEUTS SEG NFR BLD AUTO: 50 % (ref 43–75)
NRBC BLD AUTO-RTO: 0 /100 WBCS
P AXIS: 24 DEGREES
PLATELET # BLD AUTO: 256 THOUSANDS/UL (ref 149–390)
PMV BLD AUTO: 9.8 FL (ref 8.9–12.7)
POTASSIUM SERPL-SCNC: 4.3 MMOL/L (ref 3.5–5.3)
PR INTERVAL: 138 MS
PROTHROMBIN TIME: 14.4 SECONDS (ref 12.3–15)
QRS AXIS: 41 DEGREES
QRSD INTERVAL: 78 MS
QT INTERVAL: 446 MS
QTC INTERVAL: 457 MS
RBC # BLD AUTO: 4.53 MILLION/UL (ref 3.81–5.12)
SODIUM SERPL-SCNC: 141 MMOL/L (ref 135–147)
T WAVE AXIS: 58 DEGREES
VENTRICULAR RATE: 63 BPM
WBC # BLD AUTO: 7.02 THOUSAND/UL (ref 4.31–10.16)

## 2025-03-24 PROCEDURE — 99204 OFFICE O/P NEW MOD 45 MIN: CPT | Performed by: SURGERY

## 2025-03-24 PROCEDURE — 85610 PROTHROMBIN TIME: CPT

## 2025-03-24 PROCEDURE — 99244 OFF/OP CNSLTJ NEW/EST MOD 40: CPT | Performed by: PHYSICIAN ASSISTANT

## 2025-03-24 PROCEDURE — 82948 REAGENT STRIP/BLOOD GLUCOSE: CPT

## 2025-03-24 PROCEDURE — 80048 BASIC METABOLIC PNL TOTAL CA: CPT

## 2025-03-24 PROCEDURE — 70498 CT ANGIOGRAPHY NECK: CPT

## 2025-03-24 PROCEDURE — 85025 COMPLETE CBC W/AUTO DIFF WBC: CPT

## 2025-03-24 PROCEDURE — 72141 MRI NECK SPINE W/O DYE: CPT

## 2025-03-24 PROCEDURE — 96374 THER/PROPH/DIAG INJ IV PUSH: CPT

## 2025-03-24 PROCEDURE — 70490 CT SOFT TISSUE NECK W/O DYE: CPT

## 2025-03-24 PROCEDURE — 70450 CT HEAD/BRAIN W/O DYE: CPT

## 2025-03-24 PROCEDURE — 36415 COLL VENOUS BLD VENIPUNCTURE: CPT

## 2025-03-24 PROCEDURE — 71045 X-RAY EXAM CHEST 1 VIEW: CPT

## 2025-03-24 PROCEDURE — 99285 EMERGENCY DEPT VISIT HI MDM: CPT

## 2025-03-24 PROCEDURE — 72128 CT CHEST SPINE W/O DYE: CPT

## 2025-03-24 PROCEDURE — 85730 THROMBOPLASTIN TIME PARTIAL: CPT

## 2025-03-24 PROCEDURE — 93010 ELECTROCARDIOGRAM REPORT: CPT | Performed by: INTERNAL MEDICINE

## 2025-03-24 PROCEDURE — 99284 EMERGENCY DEPT VISIT MOD MDM: CPT

## 2025-03-24 PROCEDURE — 93005 ELECTROCARDIOGRAM TRACING: CPT

## 2025-03-24 PROCEDURE — 70496 CT ANGIOGRAPHY HEAD: CPT

## 2025-03-24 RX ORDER — HYDROMORPHONE HCL IN WATER/PF 6 MG/30 ML
0.2 PATIENT CONTROLLED ANALGESIA SYRINGE INTRAVENOUS EVERY 2 HOUR PRN
Refills: 0 | Status: DISCONTINUED | OUTPATIENT
Start: 2025-03-24 | End: 2025-03-24 | Stop reason: HOSPADM

## 2025-03-24 RX ORDER — ACETAMINOPHEN 325 MG/1
975 TABLET ORAL EVERY 8 HOURS
Status: DISCONTINUED | OUTPATIENT
Start: 2025-03-24 | End: 2025-03-24 | Stop reason: HOSPADM

## 2025-03-24 RX ORDER — METHOCARBAMOL 500 MG/1
500 TABLET, FILM COATED ORAL ONCE
Status: COMPLETED | OUTPATIENT
Start: 2025-03-24 | End: 2025-03-24

## 2025-03-24 RX ORDER — OXYCODONE HYDROCHLORIDE 5 MG/1
5 TABLET ORAL ONCE
Refills: 0 | Status: COMPLETED | OUTPATIENT
Start: 2025-03-24 | End: 2025-03-24

## 2025-03-24 RX ORDER — OXYCODONE HYDROCHLORIDE 5 MG/1
5 TABLET ORAL EVERY 4 HOURS PRN
Refills: 0 | Status: DISCONTINUED | OUTPATIENT
Start: 2025-03-24 | End: 2025-03-24 | Stop reason: HOSPADM

## 2025-03-24 RX ORDER — ACETAMINOPHEN 325 MG/1
975 TABLET ORAL ONCE
Status: COMPLETED | OUTPATIENT
Start: 2025-03-24 | End: 2025-03-24

## 2025-03-24 RX ORDER — METHOCARBAMOL 500 MG/1
500 TABLET, FILM COATED ORAL 3 TIMES DAILY PRN
Qty: 12 TABLET | Refills: 0 | Status: SHIPPED | OUTPATIENT
Start: 2025-03-24

## 2025-03-24 RX ORDER — ONDANSETRON 2 MG/ML
4 INJECTION INTRAMUSCULAR; INTRAVENOUS ONCE
Status: COMPLETED | OUTPATIENT
Start: 2025-03-24 | End: 2025-03-24

## 2025-03-24 RX ADMIN — METHOCARBAMOL TABLETS 500 MG: 500 TABLET, COATED ORAL at 10:12

## 2025-03-24 RX ADMIN — ACETAMINOPHEN 975 MG: 325 TABLET, FILM COATED ORAL at 16:24

## 2025-03-24 RX ADMIN — ACETAMINOPHEN 975 MG: 325 TABLET, FILM COATED ORAL at 10:11

## 2025-03-24 RX ADMIN — METHOCARBAMOL 500 MG: 500 TABLET ORAL at 16:24

## 2025-03-24 RX ADMIN — OXYCODONE HYDROCHLORIDE 5 MG: 5 TABLET ORAL at 10:12

## 2025-03-24 RX ADMIN — Medication 2.5 MG: at 16:24

## 2025-03-24 RX ADMIN — ONDANSETRON 4 MG: 2 INJECTION INTRAMUSCULAR; INTRAVENOUS at 11:52

## 2025-03-24 RX ADMIN — IOHEXOL 85 ML: 350 INJECTION, SOLUTION INTRAVENOUS at 17:07

## 2025-03-24 NOTE — ED NOTES
No change in assessment other than c/o mild headache at present.  Offered to obtain medication but pt declined needeing medication at this time, otherwise resting comfortably in bed.  C-collar remains in place     Edson Fabian RN  03/24/25 2427
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
## 2025-03-24 NOTE — TREATMENT PLAN
MRI cervical spine without contrast 3/24/2025:  Mild prevertebral edema from C2-C5, indicative of an anterior longitudinal ligament sprain, but the anterior longitudinal ligament appears intact. Remaining ligaments appear grossly normal. Mild cervical spondylosis, as described above, contributing to at most mild canal stenosis at C4-C6. No neural foraminal stenosis noted. Cervical spinal cord demonstrates normal signal intensity.    MRI with sprain of the anterior longitudinal ligament only, no tear or rupture of this.  Will do short course of [place] collar (to be worn at all times, jelly collar for showering) and discuss weaning out of this at the 2-week visit.  CTA head and neck with and without contrast has been ordered by the primary team, can reach out should there be any focal findings requiring neurosurgical reevaluation.  Otherwise neurosurgery to sign off, please reach out with questions or concerns.  Primary team updated.

## 2025-03-24 NOTE — DISCHARGE INSTR - AVS FIRST PAGE
Discharge Instructions - Neurosurgery    VISTA collar at all times except for showering change to jelly collar.  No heavy lifting. No strenuous activities. NO DRIVING.     **Please notify MD immediately if you have increased neck or arm pain. New numbness and/or weakness in your arm. Difficulty swallowing or breathing especially while lying down. Numbness or weakness in arms or legs.**    Please follow up in 2 weeks with the Neurosurgical group with repeat X-ray of cervical spine 2-3 days prior to your appointment.  You may go to any [facility] facility to get your imaging done.  Please call [phone] to schedule your imaging appointment.

## 2025-03-24 NOTE — ED NOTES
PT expressed concern about nausea with motion sickness during car rides and is concerned about the ambulance ride. Provider notified.      She Rivera RN  03/24/25 [id number]

## 2025-03-24 NOTE — ED NOTES
Provider in room to assist in removing pt off backboard using log roll technique.  Pt c/o mild tenderness only with palpation to midline upper back.  See provider charting     Edson Fabian RN  03/24/25 [id number]
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
## 2025-03-25 ENCOUNTER — TELEPHONE (OUTPATIENT)
Dept: NEUROSURGERY | Facility: CLINIC | Age: 65
End: 2025-03-25

## 2025-03-25 NOTE — TELEPHONE ENCOUNTER
3/25/25 - PT DISCHARGED TO HOME. [facility]. ADVISED PT THAT XR SHOULD BE COMPLETED 3 DAYS PRIOR TO APT AND THAT THEY ARE A WALK IN SERVICE    4/11/25 2 WK HFU W/CSPINE XR (There was no philly tear in the ligament so can likely wean out of brace when seen in the office)     [facility] [place] [facility]  2-week hospital follow-up with AP and cervical spine x-rays for anterior longitudinal ligament sprain.  There was no philly tear in the ligament so can likely wean out of brace when seen in the office.

## 2025-04-07 ENCOUNTER — HOSPITAL ENCOUNTER (OUTPATIENT)
Dept: RADIOLOGY | Facility: HOSPITAL | Age: 65
Discharge: HOME | End: 2025-04-07
Payer: COMMERCIAL

## 2025-04-07 DIAGNOSIS — S14.109A INJURY OF CERVICAL SPINE, INITIAL ENCOUNTER (HCC): ICD-10-CM

## 2025-04-07 PROCEDURE — 72040 X-RAY EXAM NECK SPINE 2-3 VW: CPT

## 2025-04-11 ENCOUNTER — TELEPHONE (OUTPATIENT)
Dept: NEUROSURGERY | Facility: CLINIC | Age: 65
End: 2025-04-11

## 2025-04-11 ENCOUNTER — OFFICE VISIT (OUTPATIENT)
Dept: NEUROSURGERY | Facility: CLINIC | Age: 65
End: 2025-04-11
Payer: OTHER MISCELLANEOUS

## 2025-04-11 VITALS
TEMPERATURE: 97.6 F | OXYGEN SATURATION: 99 % | BODY MASS INDEX: 26.12 KG/M2 | HEART RATE: 72 BPM | WEIGHT: 153 LBS | DIASTOLIC BLOOD PRESSURE: 68 MMHG | SYSTOLIC BLOOD PRESSURE: 112 MMHG | RESPIRATION RATE: 18 BRPM | HEIGHT: 64 IN

## 2025-04-11 DIAGNOSIS — M53.82 NECK MUSCLE WEAKNESS: ICD-10-CM

## 2025-04-11 DIAGNOSIS — T71.193A ASSAULT BY MANUAL STRANGULATION: ICD-10-CM

## 2025-04-11 DIAGNOSIS — R22.9 SOFT TISSUE SWELLING: Primary | ICD-10-CM

## 2025-04-11 PROCEDURE — 99215 OFFICE O/P EST HI 40 MIN: CPT | Performed by: NURSE PRACTITIONER

## 2025-04-11 NOTE — ASSESSMENT & PLAN NOTE
As addressed in HPI  imagining mRI cervical  Orders:    Ambulatory Referral to Physical Therapy; Future    Durable Medical Equipment

## 2025-04-11 NOTE — PATIENT INSTRUCTIONS
Cervical spine /[place] Collar   Plan start [place] collar wean   Day 1  through 2  4/11-4/12  off for 2 hours during the day and at HS  Day 3  through 4  4/13- 4/14 off for 4 hours  During the day and at HS  Day 5 through 6  4/15- 4/16 off for 6 hours during the day and at HS   Day  7 4/17  off for 8 hours and at HS  Day 8 4/18 off all day and at HS   Day 8 4/18 --order physical therapy to start cervical paraspinal/core muscle strengthening, passive cervical gentle ROM only, no extreme active ROM.  Explained during times she is out of collar during the day she should be sedentary relaxing reading , watching TV etc.   Advised to continue compliance with spine precautions. Previously reviewed and reinforced today .  Advised to avoid falls or if possible rear end impact and extremes of flexion/extension of cervical spine.   Reviewed Red flag signs again , if develop go immediately to ED .  Patient asked questioned about her multilevel cervical spondylosis and if it could be fixed. Reinforced prior education..  Offered patient and appointment with a neurosurgeon although she has been asymptomatic, she again declined.  She expressed an understanding of Red flag signs that warrant reporting to ED immediately for assessment. She also understands the symptoms of radiculopathy , paresthesia , motor function problems in her upper and lower extremities that if occur she should call this office for an appointment ASAP.  Advised to call if she has additional questions or concerns.  She verbalized an understanding, was appreciative and thanked me.
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
## 2025-04-21 ENCOUNTER — EVALUATION (OUTPATIENT)
Dept: PHYSICAL THERAPY | Facility: MEDICAL CENTER | Age: 65
End: 2025-04-21
Attending: NURSE PRACTITIONER
Payer: OTHER MISCELLANEOUS

## 2025-04-21 DIAGNOSIS — M53.82 NECK MUSCLE WEAKNESS: ICD-10-CM

## 2025-04-21 DIAGNOSIS — T71.193A ASSAULT BY MANUAL STRANGULATION: ICD-10-CM

## 2025-04-21 DIAGNOSIS — R22.9 SOFT TISSUE SWELLING: ICD-10-CM

## 2025-04-21 PROCEDURE — 97161 PT EVAL LOW COMPLEX 20 MIN: CPT | Performed by: PHYSICAL THERAPIST

## 2025-04-21 NOTE — PROGRESS NOTES
"Weight Management Medical Nutrition Assessment  Barry is here for 1/12 employee meal planning. Has been following with JOYCELYN in [place]. Current wt: 153.2 lbs. She has lost 21.6 lbs x just over 6 months. Tolerating zepbound 5 mg/day. Finds she has been having cravings later in the day. Food recall does show inadequate intake which may be resulting in rebound hunger. Fluid intake is also poor. Importance of adequate intake reviewed as well as interval eating. Discussed that eating small amounts more frequently may help with some of the cravings she is experiencing. We also reviewed the importance of adequate protein intake in order to maintain muscle mass. She will continue to work on hydration. She will f/u in 1 wk with JOYCELYN in [place] and then plans to transfer to [place] due to location.     Dislikes: n/a    Patient seen by Medical Provider in past 6 months:  yes  Requested to schedule appointment with Medical Provider: No    Anthropometric Measurements  Start Weight (#) & Date: 174.8 lbs 10/15/24  Current Weight (#): 153.2 lbs  TBW % Change from start weight:12.4%  Ideal Body Weight (#):(122.5 lbs 64.7\") BMI 25 148.2 lbs  Goal Weight (#):was 155 lbs but now not sure of a specific  Highest: 183 lbs  Lowest: 135 lbs    Weight Loss History  Previous weight loss attempts: Counseling with  MD  Exercise  Self Created Diets (Portion Control, Healthy Food Choices, etc.)  Contrave  zepbound    Food and Nutrition Related History  Wake up: 5:15   Bed Time:10:30    Food Recall  Breakfast:9:00: apple, sometimes 1/2 protein shake (fairlife shake)    Snack:skip  Lunch:1:30: 1/2 turkey s/w on whole grain (from cafe) OR leftovers OR cottage cheese, fruit  Snack:skip  Dinner:7:00: ~3 oz protein, salad, veggies, sometimes carb   Snack:skip    Beverages: water and coffee  Volume of beverage intake: 24 oz water, 1 cup coffee    Weekends: Same  Cravings: carbs   Trouble area of day:2:30-3:00 p.m.    Frequency of Eating out: 1x/wk  Food " restrictions:n/a  Cooking: self   Food Shopping: self    Physical Activity Intake  Activity:Walking (leisurely)  Frequency: 4x/wk  Physical limitations/barriers to exercise: n/a    Estimated Needs  Energy  SECA: BMR:n/a      X 1.3 -1000 =  [employer] Needs: BMR : 1246   1-2# loss weekly sedentary:  995             1# loss weekly lightly active:1213  Maintenance calories for sedentary activity level: 1495  Protein:67-84 gm      (1.2-1.5g/kg IBW)  Fluid Requirement Calculator   Total Fluid: 84   oz  ([place]-Segar Method)  Free Fluid: 67 oz ([place]-Segar Method - 20%)    Nutrition Diagnosis  Yes;    Inadequate energy intake  related to Food and nutrition related knowledge deficit concerning energy intake as evidenced by  Medications that affect appetite       Nutrition Intervention    Nutrition Prescription  Calories:950-1350  Protein:65-90 gm    Meal Plan (Chilo/Pro)  Breakfast: 200-250, 15-30  Snack: 100-150, 5-10  Lunch: 250-400, 20  Snack: 100-150, 5-10  Dinner: 300-400, 20  Snack: skip    Nutrition Education:    Calorie controlled menu  Lean protein food choices  Healthy snack options  Food journaling tips      Nutrition Counseling:  Strategies: meal planning, portion sizes, healthy snack choices, hydration, fiber intake, protein intake, exercise, food journal      Monitoring and Evaluation:  Evaluation criteria:  Energy Intake  Meet protein needs  Maintain adequate hydration  Monitor weekly weight  Meal planning/preparation  Food journal   Decreased portions at mealtimes and snacks  Physical activity     Barriers to learning:none  Readiness to change: Action:  (Changing behavior)  Comprehension: very good  Expected Compliance: very good
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
## 2025-04-23 ENCOUNTER — TELEPHONE (OUTPATIENT)
Age: 65
End: 2025-04-23

## 2025-04-23 ENCOUNTER — OFFICE VISIT (OUTPATIENT)
Dept: PHYSICAL THERAPY | Facility: MEDICAL CENTER | Age: 65
End: 2025-04-23
Attending: NURSE PRACTITIONER
Payer: OTHER MISCELLANEOUS

## 2025-04-23 ENCOUNTER — CLINICAL SUPPORT (OUTPATIENT)
Dept: BARIATRICS | Facility: CLINIC | Age: 65
End: 2025-04-23
Payer: COMMERCIAL

## 2025-04-23 VITALS — HEIGHT: 65 IN | WEIGHT: 153.2 LBS | BODY MASS INDEX: 25.52 KG/M2

## 2025-04-23 DIAGNOSIS — M53.82 NECK MUSCLE WEAKNESS: ICD-10-CM

## 2025-04-23 DIAGNOSIS — E66.3 OVERWEIGHT WITH BODY MASS INDEX (BMI) OF 25 TO 25.9 IN ADULT: Primary | ICD-10-CM

## 2025-04-23 DIAGNOSIS — T71.193A ASSAULT BY MANUAL STRANGULATION: Primary | ICD-10-CM

## 2025-04-23 DIAGNOSIS — E66.3 OVERWEIGHT WITH BODY MASS INDEX (BMI) OF 29 TO 29.9 IN ADULT: ICD-10-CM

## 2025-04-23 DIAGNOSIS — R22.9 SOFT TISSUE SWELLING: ICD-10-CM

## 2025-04-23 PROCEDURE — RECHECK

## 2025-04-23 PROCEDURE — 97110 THERAPEUTIC EXERCISES: CPT | Performed by: PHYSICAL THERAPIST

## 2025-04-23 PROCEDURE — 97140 MANUAL THERAPY 1/> REGIONS: CPT | Performed by: PHYSICAL THERAPIST

## 2025-04-23 PROCEDURE — S9470 NUTRITIONAL COUNSELING, DIET: HCPCS

## 2025-04-23 RX ORDER — TIRZEPATIDE 5 MG/.5ML
5 INJECTION, SOLUTION SUBCUTANEOUS WEEKLY
Qty: 2 ML | Refills: 0 | Status: SHIPPED | OUTPATIENT
Start: 2025-04-23 | End: 2025-06-18
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
## 2025-04-23 NOTE — TELEPHONE ENCOUNTER
Does patient need appointment or can we provide her RTW letter, she is a [employer] employee and weaning from collar.

## 2025-04-23 NOTE — TELEPHONE ENCOUNTER
Pt called in after speaking to her W/C rep. Pt is being told she needs to have a f/u scheduled with NSX.   LOV 4/11/25 with Socorro. I did not see f/u instructions. Pt is currently in PT but they are not able to release her to go back to work it would need to come from NSX.     Please call pt and assist with f/u appt.     Thank you.

## 2025-04-27 NOTE — TELEPHONE ENCOUNTER
LOV wean for collar over one week then start physical therapy  ,  RTO prn ., I never advised patient not to work --She was seen in the office  , once weaned no longer hade cervical spine restrictions or need for collar unless she is having problems than RTO for reassessment.

## 2025-04-28 ENCOUNTER — TELEPHONE (OUTPATIENT)
Dept: NEUROSURGERY | Facility: CLINIC | Age: 65
End: 2025-04-28

## 2025-04-28 NOTE — TELEPHONE ENCOUNTER
4/28/25 contacted patient and left her a voice message that she can return to work with no restrictions and a letter can be sent to her via my chart or mailed as per the provider she should return unless she is having problems then she would need an appointment to be reassessed.

## 2025-04-29 ENCOUNTER — OFFICE VISIT (OUTPATIENT)
Dept: PHYSICAL THERAPY | Facility: MEDICAL CENTER | Age: 65
End: 2025-04-29
Attending: NURSE PRACTITIONER
Payer: OTHER MISCELLANEOUS

## 2025-04-29 DIAGNOSIS — T71.193A ASSAULT BY MANUAL STRANGULATION: Primary | ICD-10-CM

## 2025-04-29 DIAGNOSIS — M53.82 NECK MUSCLE WEAKNESS: ICD-10-CM

## 2025-04-29 PROCEDURE — 97140 MANUAL THERAPY 1/> REGIONS: CPT | Performed by: PHYSICAL THERAPIST

## 2025-04-29 PROCEDURE — 97110 THERAPEUTIC EXERCISES: CPT | Performed by: PHYSICAL THERAPIST
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
## 2025-04-30 ENCOUNTER — APPOINTMENT (OUTPATIENT)
Age: 65
End: 2025-04-30
Attending: STUDENT IN AN ORGANIZED HEALTH CARE EDUCATION/TRAINING PROGRAM
Payer: COMMERCIAL

## 2025-04-30 ENCOUNTER — OCCMED (OUTPATIENT)
Age: 65
End: 2025-04-30
Payer: OTHER MISCELLANEOUS

## 2025-04-30 ENCOUNTER — OFFICE VISIT (OUTPATIENT)
Dept: FAMILY MEDICINE CLINIC | Facility: CLINIC | Age: 65
End: 2025-04-30
Payer: COMMERCIAL

## 2025-04-30 VITALS
OXYGEN SATURATION: 98 % | BODY MASS INDEX: 23.48 KG/M2 | TEMPERATURE: 98.2 F | SYSTOLIC BLOOD PRESSURE: 124 MMHG | HEART RATE: 65 BPM | DIASTOLIC BLOOD PRESSURE: 84 MMHG | HEIGHT: 67 IN | RESPIRATION RATE: 16 BRPM | WEIGHT: 149.6 LBS

## 2025-04-30 DIAGNOSIS — F43.10 PTSD (POST-TRAUMATIC STRESS DISORDER): Primary | ICD-10-CM

## 2025-04-30 DIAGNOSIS — S30.0XXD COCCYX CONTUSION, SUBSEQUENT ENCOUNTER: ICD-10-CM

## 2025-04-30 DIAGNOSIS — F33.42 RECURRENT MAJOR DEPRESSIVE DISORDER, IN FULL REMISSION (HCC): ICD-10-CM

## 2025-04-30 DIAGNOSIS — S30.0XXD COCCYX CONTUSION, SUBSEQUENT ENCOUNTER: Primary | ICD-10-CM

## 2025-04-30 DIAGNOSIS — T71.193A ASSAULT BY MANUAL STRANGULATION: ICD-10-CM

## 2025-04-30 PROCEDURE — 72220 X-RAY EXAM SACRUM TAILBONE: CPT

## 2025-04-30 PROCEDURE — 99215 OFFICE O/P EST HI 40 MIN: CPT | Performed by: STUDENT IN AN ORGANIZED HEALTH CARE EDUCATION/TRAINING PROGRAM

## 2025-04-30 PROCEDURE — 99214 OFFICE O/P EST MOD 30 MIN: CPT | Performed by: FAMILY MEDICINE
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
## 2025-05-01 ENCOUNTER — OFFICE VISIT (OUTPATIENT)
Dept: BARIATRICS | Facility: CLINIC | Age: 65
End: 2025-05-01
Payer: COMMERCIAL

## 2025-05-01 ENCOUNTER — TELEPHONE (OUTPATIENT)
Age: 65
End: 2025-05-01

## 2025-05-01 VITALS
SYSTOLIC BLOOD PRESSURE: 120 MMHG | DIASTOLIC BLOOD PRESSURE: 70 MMHG | HEIGHT: 65 IN | WEIGHT: 149.2 LBS | OXYGEN SATURATION: 98 % | HEART RATE: 80 BPM | BODY MASS INDEX: 24.86 KG/M2

## 2025-05-01 DIAGNOSIS — E66.3 OVERWEIGHT WITH BODY MASS INDEX (BMI) OF 25 TO 25.9 IN ADULT: Primary | ICD-10-CM

## 2025-05-01 PROCEDURE — 99214 OFFICE O/P EST MOD 30 MIN: CPT | Performed by: NURSE PRACTITIONER

## 2025-05-01 NOTE — TELEPHONE ENCOUNTER
Contacted patient in regards to ASAP Referral  in attempts to verify patient's needs of services and add patient to proper wait list. LVM for patient to contact intake dept  in regards to ASAP Referral at [phone] opt 3. 1st attempt.

## 2025-05-01 NOTE — PROGRESS NOTES
Assessment/Plan:     Overweight with body mass index (BMI) of 25 to 25.9 in adult  Patient has transitioned from Class 2 obesity to Overweight with medication weight management and GLP-1 medications  - Patient is pursuing Conservative Program and follow up visits with medical weight management provider  - Initial weight loss goal of 5-10% weight loss for improved health. Weight loss can improve patient's co-morbid conditions and/or prevent weight-related complications.  - Explained the importance of continuing lifestyle changes in addition to any anti-obesity medications.   - Labs reviewed from 3/2025     General Recommendations:  Nutrition:  Eat breakfast daily.  Do not skip meals.      Food log (ie.) www.[employer].com, sparkpeople.com, loseit.com, calorieking.com, etc.     Practice mindful eating.  Be sure to set aside time to eat, eat slowly, and savor your food.     Hydration:    At least 64oz of water daily.  No sugar sweetened beverages.  No juice (eat the fruit instead).     Exercise:  Studies have shown that the ideal exercise goal is somewhere between 150 to 300 minutes of moderate intensity exercise a week.  Start with exercising 10 minutes every other day and gradually increase physical activity with a goal of at least 150 minutes of moderate intensity exercise a week, divided over at least 3 days a week.  An example of this would be exercising 30 minutes a day, 5 days a week.  Resistance training can increase muscle mass and increase our resting metabolic rate.   FULL BODY resistance training is recommended 2-3 times a week.  Do not do this on consecutive days to allow for muscle recovery.     Aim for a bare minimum 5000 steps, even on days you do not exercise.     Monitoring:   Weigh yourself daily.  If this causes undue stress, then just weigh yourself once a week.  Weigh yourself the same time of the day with the same amount of clothing on.  Preferably this should be done after waking up, before  you eat, and with no clothing or minimal clothing on.     Specific Goals:  Calorie goal:  8097-7873 subhash/day for weight loss and 0051-6432 calories to maintain  Patient lifestyle habits were reviewed and she was congratulated on achieving her weight loss goals.  Nutrition was discussed and she will continue with her balanced nutrition and smaller portion. She will continue to try to increase her calories into her maintenance range and avoid skipping meals. She will utilize the dietitian recommendations to make sure she is taking in adequate protein.  Medications were discussed and she will continue on Zepbound 5mg. She will consider spacing out the dose to every 10 days to slow her weight loss and attempt to maintain her weight.   Patient will follow up in 2-3 months to evaluate her weight loss and monitor her weight maintenance plan.         Barry was seen today for follow-up.    Diagnoses and all orders for this visit:    Overweight with body mass index (BMI) of 25 to 25.9 in adult        Total time spent reviewing chart, interviewing patient, examining patient, discussing plan, answering all questions, and documentin minutes with >50% face-to-face time with the patient.    Follow up in approximately 2 months with Non-Surgical Physician/Advanced Practitioner.    Subjective:   Chief Complaint   Patient presents with    Follow-up       Patient ID: Barry Travis  is a 65 y.o. female with excess weight/obesity here to pursue weight management.  Patient is pursuing Conservative Program.   Most recent notes and records were reviewed.    HPI    Wt Readings from Last 20 Encounters:   25 67.7 kg (149 lb 3.2 oz)   25 67.9 kg (149 lb 9.6 oz)   25 69.5 kg (153 lb 3.2 oz)   25 69.4 kg (153 lb)   25 69.4 kg (153 lb)   25 71.7 kg (158 lb)   25 73.1 kg (161 lb 3.2 oz)   10/28/24 79.9 kg (176 lb 3.2 oz)   10/15/24 79.3 kg (174 lb 12.8 oz)   24 79.8 kg (176 lb)   24 78.2  kg (172 lb 6.4 oz)   04/16/24 79.3 kg (174 lb 12.8 oz)   03/18/24 78 kg (172 lb)   02/12/24 79.8 kg (176 lb)   01/09/24 78.9 kg (174 lb)   10/13/23 77.4 kg (170 lb 9.6 oz)   08/21/23 78.6 kg (173 lb 3.2 oz)   07/20/23 75.3 kg (166 lb)   12/12/22 75.3 kg (166 lb)   06/07/22 75.8 kg (167 lb)       Patient presents today to medical weight management office for follow up.  Patient continues on Zepbound 5mg and has hit her weight loss goals. She has met with the dietician and has been focused on better balancing of her nutrition. She is incorporating more carbohydrates and protein throughout the day.  Patient has been reporting some increased cravings for sweets both in the afternoon and after dinner.  She is trying to utilize healthier options or small portions of things to satisfy these cravings.  Patient has been out of work due to an injury at work.  She has been working with the physical therapist to help with neck pain.   Patient is happy with her progress and wishes to continue and will follow-up with the [place] office due to her proximity to that location.      Weight loss medication and dose: Zepbound 5mg  Started weight and date: 174.8 lbs in 10/2024  Current weight: 149.2 lbs (161.2 lbs at last OV)  Difference: -25.6 lbs (-12 lbs since last OV)  Percentage of weight loss: -14.6%  Goal weight: 155 lbs    Starting BMI: 29.31 in 10/2024  Current BMI: 25.06    Waist Measurements:  10/2024: 39.25 in  4/2025: 35 in    MWM Weights:  10/2024: 174.8 lbs *started on Zepbound 5mg  1/2025: 161.2 lbs (-13.6 lbs)  5/2025: 149.2 lbs (-12 lbs)    Nutrition Prescription  Calories:950-1350 (1969-7898 calories to maintain)  Protein:65-90 gm    Diet recall:  B: eggs and 1/2 protein shake  L: fruit OR cheese  S: pretzels with PB OR fruit  D: fish and vegetables/salad  S: cookie (oreo)  Take out frequency: 2 times a month    Hydration: very little water, coffee - creamer  Alcohol: occasionally  Smoking: no  Exercise: walks dog -  "walking at work  Occupation: works in [employer] all day  Sleep: well with CPAP      The following portions of the patient's history were reviewed and updated as appropriate: allergies, current medications, past family history, past medical history, past social history, past surgical history, and problem list.    Family History   Problem Relation Age of Onset    Ovarian cancer Mother     Hypertension Father     Diabetes Father     Hyperlipidemia Father     Hypertension Brother     No Known Problems Maternal Grandmother     No Known Problems Maternal Grandfather     No Known Problems Paternal Grandmother     No Known Problems Paternal Grandfather     Breast cancer Neg Hx         Review of Systems   Constitutional:  Negative for fatigue.   HENT:  Negative for sore throat.    Respiratory:  Negative for cough and shortness of breath.    Cardiovascular:  Negative for chest pain, palpitations and leg swelling.   Gastrointestinal:  Negative for abdominal pain, constipation, diarrhea and nausea.   Genitourinary:  Negative for dysuria.   Musculoskeletal:  Negative for arthralgias and back pain.   Skin:  Negative for rash.   Neurological:  Negative for headaches.   Psychiatric/Behavioral:  Negative for dysphoric mood. The patient is not nervous/anxious.        Objective:  /70   Pulse 80   Ht 5' 4.7\" (1.643 m)   Wt 67.7 kg (149 lb 3.2 oz)   SpO2 98%   BMI 25.06 kg/m²     Physical Exam  Vitals and nursing note reviewed.   Constitutional:       Appearance: Normal appearance. She is obese.   HENT:      Head: Normocephalic.   Pulmonary:      Effort: Pulmonary effort is normal.   Neurological:      General: No focal deficit present.      Mental Status: She is alert and oriented to person, place, and time.   Psychiatric:         Mood and Affect: Mood normal.         Behavior: Behavior normal.         Thought Content: Thought content normal.         Judgment: Judgment normal.            Labs   Most recent labs reviewed "   Lab Results   Component Value Date    SODIUM 141 03/24/2025    K 4.3 03/24/2025     03/24/2025    CO2 25 03/24/2025    AGAP 8 03/24/2025    BUN 24 03/24/2025    CREATININE 0.85 03/24/2025    GLUC 92 03/24/2025    GLUF 96 03/18/2024    CALCIUM 10.0 03/24/2025    AST 21 11/09/2018    ALT 44 11/09/2018    ALKPHOS 69 11/09/2018    TP 7.4 11/09/2018    TBILI 0.6 11/09/2018    EGFR 73 03/24/2025     Lab Results   Component Value Date    HGBA1C 5.7 (H) 10/05/2023     Lab Results   Component Value Date    TSH 3.04 11/09/2018     Lab Results   Component Value Date    CHOLESTEROL 153 10/05/2023     Lab Results   Component Value Date    HDL 30 (L) 10/05/2023     Lab Results   Component Value Date    TRIG 238 (H) 10/05/2023     Lab Results   Component Value Date    LDLCALC 75 10/05/2023

## 2025-05-02 ENCOUNTER — TELEPHONE (OUTPATIENT)
Age: 65
End: 2025-05-02

## 2025-05-02 NOTE — TELEPHONE ENCOUNTER
Contacted patient in regards to ASAP Referral  in attempts to verify patient's needs of services and add patient to proper wait list. LVM for patient to contact intake dept  in regards to ASAP Referral at [phone]. 2nd attempt.

## 2025-05-02 NOTE — ASSESSMENT & PLAN NOTE
Patient has transitioned from Class 2 obesity to Overweight with medication weight management and GLP-1 medications  - Patient is pursuing Conservative Program and follow up visits with medical weight management provider  - Initial weight loss goal of 5-10% weight loss for improved health. Weight loss can improve patient's co-morbid conditions and/or prevent weight-related complications.  - Explained the importance of continuing lifestyle changes in addition to any anti-obesity medications.   - Labs reviewed from 3/2025     General Recommendations:  Nutrition:  Eat breakfast daily.  Do not skip meals.      Food log (ie.) www.[employer].com, sparkpeople.com, loseit.com, calorieking.com, etc.     Practice mindful eating.  Be sure to set aside time to eat, eat slowly, and savor your food.     Hydration:    At least 64oz of water daily.  No sugar sweetened beverages.  No juice (eat the fruit instead).     Exercise:  Studies have shown that the ideal exercise goal is somewhere between 150 to 300 minutes of moderate intensity exercise a week.  Start with exercising 10 minutes every other day and gradually increase physical activity with a goal of at least 150 minutes of moderate intensity exercise a week, divided over at least 3 days a week.  An example of this would be exercising 30 minutes a day, 5 days a week.  Resistance training can increase muscle mass and increase our resting metabolic rate.   FULL BODY resistance training is recommended 2-3 times a week.  Do not do this on consecutive days to allow for muscle recovery.     Aim for a bare minimum 5000 steps, even on days you do not exercise.     Monitoring:   Weigh yourself daily.  If this causes undue stress, then just weigh yourself once a week.  Weigh yourself the same time of the day with the same amount of clothing on.  Preferably this should be done after waking up, before you eat, and with no clothing or minimal clothing on.     Specific Goals:  Calorie  goal:  4740-5114 subhash/day for weight loss and 8508-9573 calories to maintain  Patient lifestyle habits were reviewed and she was congratulated on achieving her weight loss goals.  Nutrition was discussed and she will continue with her balanced nutrition and smaller portion. She will continue to try to increase her calories into her maintenance range and avoid skipping meals. She will utilize the dietitian recommendations to make sure she is taking in adequate protein.  Medications were discussed and she will continue on Zepbound 5mg. She will consider spacing out the dose to every 10 days to slow her weight loss and attempt to maintain her weight.   Patient will follow up in 2-3 months to evaluate her weight loss and monitor her weight maintenance plan.

## 2025-05-05 ENCOUNTER — TELEPHONE (OUTPATIENT)
Age: 65
End: 2025-05-05
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
## 2025-05-06 ENCOUNTER — OFFICE VISIT (OUTPATIENT)
Dept: PHYSICAL THERAPY | Facility: MEDICAL CENTER | Age: 65
End: 2025-05-06
Attending: NURSE PRACTITIONER
Payer: OTHER MISCELLANEOUS

## 2025-05-06 DIAGNOSIS — R22.9 SOFT TISSUE SWELLING: ICD-10-CM

## 2025-05-06 DIAGNOSIS — M53.82 NECK MUSCLE WEAKNESS: ICD-10-CM

## 2025-05-06 DIAGNOSIS — T71.193A ASSAULT BY MANUAL STRANGULATION: Primary | ICD-10-CM

## 2025-05-06 PROCEDURE — 97110 THERAPEUTIC EXERCISES: CPT | Performed by: PHYSICAL THERAPIST

## 2025-05-06 PROCEDURE — 97140 MANUAL THERAPY 1/> REGIONS: CPT | Performed by: PHYSICAL THERAPIST
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
## 2025-05-10 DIAGNOSIS — K21.9 GASTROESOPHAGEAL REFLUX DISEASE WITHOUT ESOPHAGITIS: ICD-10-CM

## 2025-05-10 DIAGNOSIS — F41.1 ANXIETY STATE: ICD-10-CM

## 2025-05-10 DIAGNOSIS — E78.2 MIXED HYPERLIPIDEMIA: ICD-10-CM

## 2025-05-10 DIAGNOSIS — F32.5 MAJOR DEPRESSION IN COMPLETE REMISSION (HCC): ICD-10-CM

## 2025-05-10 DIAGNOSIS — I10 ESSENTIAL HYPERTENSION: ICD-10-CM

## 2025-05-10 DIAGNOSIS — G43.909 MIGRAINE WITHOUT STATUS MIGRAINOSUS, NOT INTRACTABLE, UNSPECIFIED MIGRAINE TYPE: ICD-10-CM

## 2025-05-10 DIAGNOSIS — N95.2 VAGINAL ATROPHY: ICD-10-CM

## 2025-05-11 RX ORDER — TOPIRAMATE 50 MG/1
50 TABLET, FILM COATED ORAL DAILY
Qty: 90 TABLET | Refills: 1 | Status: SHIPPED | OUTPATIENT
Start: 2025-05-11

## 2025-05-11 RX ORDER — ESTRADIOL 0.1 MG/G
0.5 CREAM VAGINAL 2 TIMES WEEKLY
Qty: 42.5 G | Refills: 0 | Status: SHIPPED | OUTPATIENT
Start: 2025-05-12

## 2025-05-11 RX ORDER — BUPROPION HYDROCHLORIDE 150 MG/1
150 TABLET ORAL DAILY
Qty: 90 TABLET | Refills: 1 | Status: SHIPPED | OUTPATIENT
Start: 2025-05-11

## 2025-05-11 RX ORDER — ATENOLOL 50 MG/1
50 TABLET ORAL DAILY
Qty: 90 TABLET | Refills: 1 | Status: SHIPPED | OUTPATIENT
Start: 2025-05-11

## 2025-05-11 RX ORDER — OMEPRAZOLE 40 MG/1
40 CAPSULE, DELAYED RELEASE ORAL DAILY
Qty: 90 CAPSULE | Refills: 1 | Status: SHIPPED | OUTPATIENT
Start: 2025-05-11

## 2025-05-12 RX ORDER — ATORVASTATIN CALCIUM 10 MG/1
10 TABLET, FILM COATED ORAL DAILY
Qty: 100 TABLET | Refills: 3 | Status: SHIPPED | OUTPATIENT
Start: 2025-05-12

## 2025-05-14 ENCOUNTER — TELEPHONE (OUTPATIENT)
Dept: PSYCHIATRY | Facility: CLINIC | Age: 65
End: 2025-05-14

## 2025-05-14 NOTE — TELEPHONE ENCOUNTER
One week follow up call for New Patient appointment with Michael Bryant [[id number]] on 6/16/25  was made on 5/5/25. Writer informed patient of New Patient paperwork needing to be completed 5 days prior to the appointment. Writer confirmed paperwork has been sent via My Chart.

## 2025-05-16 ENCOUNTER — APPOINTMENT (OUTPATIENT)
Dept: PHYSICAL THERAPY | Facility: MEDICAL CENTER | Age: 65
End: 2025-05-16
Attending: NURSE PRACTITIONER
Payer: OTHER MISCELLANEOUS

## 2025-05-21 ENCOUNTER — OFFICE VISIT (OUTPATIENT)
Dept: PHYSICAL THERAPY | Facility: MEDICAL CENTER | Age: 65
End: 2025-05-21
Attending: NURSE PRACTITIONER
Payer: OTHER MISCELLANEOUS

## 2025-05-21 DIAGNOSIS — R22.9 SOFT TISSUE SWELLING: ICD-10-CM

## 2025-05-21 DIAGNOSIS — T71.193A ASSAULT BY MANUAL STRANGULATION: Primary | ICD-10-CM

## 2025-05-21 DIAGNOSIS — M53.82 NECK MUSCLE WEAKNESS: ICD-10-CM

## 2025-05-21 PROCEDURE — 97110 THERAPEUTIC EXERCISES: CPT | Performed by: PHYSICAL THERAPIST

## 2025-05-21 PROCEDURE — 97140 MANUAL THERAPY 1/> REGIONS: CPT | Performed by: PHYSICAL THERAPIST

## 2025-05-22 ENCOUNTER — OFFICE VISIT (OUTPATIENT)
Dept: FAMILY MEDICINE CLINIC | Facility: CLINIC | Age: 65
End: 2025-05-22
Payer: COMMERCIAL

## 2025-05-22 ENCOUNTER — APPOINTMENT (OUTPATIENT)
Age: 65
End: 2025-05-22
Payer: OTHER MISCELLANEOUS

## 2025-05-22 VITALS
RESPIRATION RATE: 16 BRPM | DIASTOLIC BLOOD PRESSURE: 80 MMHG | SYSTOLIC BLOOD PRESSURE: 110 MMHG | TEMPERATURE: 97.3 F | BODY MASS INDEX: 25.33 KG/M2 | HEART RATE: 61 BPM | WEIGHT: 152 LBS | HEIGHT: 65 IN | OXYGEN SATURATION: 97 %

## 2025-05-22 DIAGNOSIS — T71.193A ASSAULT BY MANUAL STRANGULATION: ICD-10-CM

## 2025-05-22 DIAGNOSIS — F43.10 PTSD (POST-TRAUMATIC STRESS DISORDER): Primary | ICD-10-CM

## 2025-05-22 DIAGNOSIS — L30.9 DERMATITIS: ICD-10-CM

## 2025-05-22 DIAGNOSIS — F33.42 RECURRENT MAJOR DEPRESSIVE DISORDER, IN FULL REMISSION (HCC): ICD-10-CM

## 2025-05-22 PROCEDURE — 99215 OFFICE O/P EST HI 40 MIN: CPT | Performed by: STUDENT IN AN ORGANIZED HEALTH CARE EDUCATION/TRAINING PROGRAM

## 2025-05-22 PROCEDURE — 99214 OFFICE O/P EST MOD 30 MIN: CPT | Performed by: FAMILY MEDICINE

## 2025-05-22 RX ORDER — CLOTRIMAZOLE AND BETAMETHASONE DIPROPIONATE 10; .64 MG/G; MG/G
CREAM TOPICAL 2 TIMES DAILY
Qty: 45 G | Refills: 2 | Status: SHIPPED | OUTPATIENT
Start: 2025-05-22

## 2025-05-22 NOTE — PATIENT INSTRUCTIONS
Medications stay the same.  Not making a lot of progress as far as PTSD.  Psychiatry appointments are scheduled.  Suggest doing something positive every day.  Recheck in 1 month.
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
## 2025-05-28 ENCOUNTER — OFFICE VISIT (OUTPATIENT)
Dept: PHYSICAL THERAPY | Facility: MEDICAL CENTER | Age: 65
End: 2025-05-28
Attending: NURSE PRACTITIONER
Payer: OTHER MISCELLANEOUS

## 2025-05-28 DIAGNOSIS — R22.9 SOFT TISSUE SWELLING: ICD-10-CM

## 2025-05-28 DIAGNOSIS — M53.82 NECK MUSCLE WEAKNESS: ICD-10-CM

## 2025-05-28 DIAGNOSIS — T71.193A ASSAULT BY MANUAL STRANGULATION: Primary | ICD-10-CM

## 2025-05-28 PROCEDURE — 97140 MANUAL THERAPY 1/> REGIONS: CPT | Performed by: PHYSICAL THERAPIST

## 2025-05-28 PROCEDURE — 97110 THERAPEUTIC EXERCISES: CPT | Performed by: PHYSICAL THERAPIST
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
## 2025-06-02 DIAGNOSIS — E66.3 OVERWEIGHT WITH BODY MASS INDEX (BMI) OF 29 TO 29.9 IN ADULT: ICD-10-CM

## 2025-06-02 RX ORDER — TIRZEPATIDE 5 MG/.5ML
5 INJECTION, SOLUTION SUBCUTANEOUS WEEKLY
Qty: 2 ML | Refills: 1 | Status: SHIPPED | OUTPATIENT
Start: 2025-06-02 | End: 2025-07-28

## 2025-06-04 ENCOUNTER — OFFICE VISIT (OUTPATIENT)
Dept: PHYSICAL THERAPY | Facility: MEDICAL CENTER | Age: 65
End: 2025-06-04
Attending: NURSE PRACTITIONER
Payer: OTHER MISCELLANEOUS

## 2025-06-04 DIAGNOSIS — M53.82 NECK MUSCLE WEAKNESS: ICD-10-CM

## 2025-06-04 DIAGNOSIS — T71.193A ASSAULT BY MANUAL STRANGULATION: Primary | ICD-10-CM

## 2025-06-04 DIAGNOSIS — R22.9 SOFT TISSUE SWELLING: ICD-10-CM

## 2025-06-04 PROCEDURE — 97140 MANUAL THERAPY 1/> REGIONS: CPT | Performed by: PHYSICAL THERAPIST

## 2025-06-04 PROCEDURE — 97112 NEUROMUSCULAR REEDUCATION: CPT | Performed by: PHYSICAL THERAPIST

## 2025-06-04 PROCEDURE — 97110 THERAPEUTIC EXERCISES: CPT | Performed by: PHYSICAL THERAPIST
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
## 2025-06-16 ENCOUNTER — OFFICE VISIT (OUTPATIENT)
Dept: BEHAVIORAL/MENTAL HEALTH CLINIC | Facility: CLINIC | Age: 65
End: 2025-06-16
Payer: COMMERCIAL

## 2025-06-16 DIAGNOSIS — F43.10 POST TRAUMATIC STRESS DISORDER (PTSD): Primary | ICD-10-CM

## 2025-06-16 PROCEDURE — 90791 PSYCH DIAGNOSTIC EVALUATION: CPT | Performed by: SOCIAL WORKER

## 2025-06-16 NOTE — PSYCH
Behavioral Health Psychotherapy Assessment    Date of Initial Psychotherapy Assessment: 06/16/25  Referral Source: PCP  Has a release of information been signed for the referral source? Yes    Preferred Name: Barry Herring  Preferred Pronouns: She/her  YOB: 1960 Age: 65 y.o.  Sex assigned at birth: female   Gender Identity: FEMALE  Race:   Preferred Language: English    Emergency Contact:  Full Name: JEREMIAH HERRING [phone]  Relationship to Client:   Contact information: SEE ABOVE    Primary Care Physician:  Ramiro Ghotra MD  [address] [place] [address]  [phone]  Has a release of information been signed? Yes    Physical Health History:  Past surgical procedures: MULTIPLE  Do you have a history of any of the following: none   Do you have any mobility issues? No  Developmental History: MILESTONES MET    Relevant Family History:  NONE REPORTED    Presenting Problem (What brings you in?)  DEPRESSION, ANXIETY, PTSD    Mental Health Advance Directive:  Do you currently have a Mental Health Advance Directive?yes    Diagnosis:  No diagnosis found.    Initial Assessment:     Current Mental Status:    Appearance: appropriate      Behavior/Manner: cooperative      Affect/Mood:  Happy    Speech:  Normal    Sleep:  Interrupted    Oriented to: oriented to self, oriented to place and oriented to time       Clinical Symptoms    Anxiety: yes      Depression Symptoms: irritable      Anxiety Symptoms: fatigues easily, muscle tension, irritable and feeling of choking      Have you ever been assaultive to others or the environment: No      Have you ever been self-injurious: No      Counseling History:  Previous Counseling or Treatment  (Mental Health or Drug & Alcohol): No    Have you previously taken psychiatric medications: No      Suicide Risk Assessment  Have you ever had a suicide attempt: No    Have you had incidents of suicidal ideation: No    Are you currently experiencing  suicidal thoughts: No      Substance Abuse/Addiction Assessment:  Alcohol: No    Heroin: No    Fentanyl: No    Opiates: No    Cocaine: No    Amphetamines: No    Hallucinogens: No    Club Drugs: No    Benzodiazepines: No    Other Rx Meds: No    Marijuana: No    Tobacco/Nicotine: No    Have you experienced blackouts as a result of substance use: No    Have you had any periods of abstinence: No    Have you experienced symptoms of withdrawal: No    Have you ever overdosed on any substances?: No    Are you currently using any Medication Assisted Treatment for Substance Use: No      Compulsive Behaviors:  Compulsive Behavior Information:  NONE    Disordered Eating History:  Do you have a history of disordered eating: No      Social Determinants of Health:    SDOH:  None    Trauma and Abuse History:    Have you ever been abused: No      Legal History:    Have you ever been arrested  or had a DUI: No      Have you been incarcerated: No      Are you currently on parole/probation: No      Any current Children and Youth involvement: No      Any pending legal charges: No      Relationship History:    Current marital status:       Relationship History:  FAMILY    Employment History    Are you currently employed: Yes      Longest period of employment:  2YEARS    Employer/ Job title:      Sources of income/financial support:  Work     History:      Status: no history of  duty  Educational History:     Have you ever been diagnosed with a learning disability: No      Highest level of education:  Some college    Have you ever had an IEP or 504-plan: No      Do you need assistance with reading or writing: No      Recommended Treatment:     Psychotherapy:  Individual sessions    Frequency:  1 time    Session frequency:  Weekly      Visit start and stop times:  1:00-2:00  06/16/25
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
## 2025-06-17 ENCOUNTER — APPOINTMENT (OUTPATIENT)
Dept: PHYSICAL THERAPY | Facility: MEDICAL CENTER | Age: 65
End: 2025-06-17
Attending: NURSE PRACTITIONER
Payer: OTHER MISCELLANEOUS

## 2025-06-18 ENCOUNTER — OFFICE VISIT (OUTPATIENT)
Dept: PHYSICAL THERAPY | Facility: MEDICAL CENTER | Age: 65
End: 2025-06-18
Attending: NURSE PRACTITIONER
Payer: OTHER MISCELLANEOUS

## 2025-06-18 DIAGNOSIS — R22.9 SOFT TISSUE SWELLING: ICD-10-CM

## 2025-06-18 DIAGNOSIS — T71.193A ASSAULT BY MANUAL STRANGULATION: Primary | ICD-10-CM

## 2025-06-18 DIAGNOSIS — M53.82 NECK MUSCLE WEAKNESS: ICD-10-CM

## 2025-06-18 PROCEDURE — 97112 NEUROMUSCULAR REEDUCATION: CPT | Performed by: PHYSICAL THERAPIST

## 2025-06-18 PROCEDURE — 97140 MANUAL THERAPY 1/> REGIONS: CPT | Performed by: PHYSICAL THERAPIST

## 2025-06-18 PROCEDURE — 97110 THERAPEUTIC EXERCISES: CPT | Performed by: PHYSICAL THERAPIST
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
## 2025-06-19 ENCOUNTER — APPOINTMENT (OUTPATIENT)
Age: 65
End: 2025-06-19
Payer: OTHER MISCELLANEOUS

## 2025-06-19 PROCEDURE — 99215 OFFICE O/P EST HI 40 MIN: CPT | Performed by: STUDENT IN AN ORGANIZED HEALTH CARE EDUCATION/TRAINING PROGRAM

## 2025-06-25 ENCOUNTER — OFFICE VISIT (OUTPATIENT)
Dept: BEHAVIORAL/MENTAL HEALTH CLINIC | Facility: CLINIC | Age: 65
End: 2025-06-25
Payer: COMMERCIAL

## 2025-06-25 DIAGNOSIS — F43.10 POST TRAUMATIC STRESS DISORDER (PTSD): Primary | ICD-10-CM

## 2025-06-25 PROCEDURE — 90834 PSYTX W PT 45 MINUTES: CPT | Performed by: SOCIAL WORKER
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
## 2025-06-26 ENCOUNTER — OFFICE VISIT (OUTPATIENT)
Dept: FAMILY MEDICINE CLINIC | Facility: CLINIC | Age: 65
End: 2025-06-26
Payer: COMMERCIAL

## 2025-06-26 VITALS
HEIGHT: 65 IN | TEMPERATURE: 97.2 F | SYSTOLIC BLOOD PRESSURE: 112 MMHG | OXYGEN SATURATION: 97 % | BODY MASS INDEX: 24.49 KG/M2 | DIASTOLIC BLOOD PRESSURE: 68 MMHG | HEART RATE: 65 BPM | WEIGHT: 147 LBS

## 2025-06-26 DIAGNOSIS — Z12.31 ENCOUNTER FOR SCREENING MAMMOGRAM FOR BREAST CANCER: ICD-10-CM

## 2025-06-26 DIAGNOSIS — F33.42 RECURRENT MAJOR DEPRESSIVE DISORDER, IN FULL REMISSION (HCC): ICD-10-CM

## 2025-06-26 DIAGNOSIS — E66.3 OVERWEIGHT WITH BODY MASS INDEX (BMI) OF 29 TO 29.9 IN ADULT: ICD-10-CM

## 2025-06-26 DIAGNOSIS — Z00.00 HEALTH MAINTENANCE EXAMINATION: Primary | ICD-10-CM

## 2025-06-26 DIAGNOSIS — T71.193A ASSAULT BY MANUAL STRANGULATION: ICD-10-CM

## 2025-06-26 DIAGNOSIS — E78.2 MIXED HYPERLIPIDEMIA: ICD-10-CM

## 2025-06-26 DIAGNOSIS — Z23 ENCOUNTER FOR IMMUNIZATION: ICD-10-CM

## 2025-06-26 DIAGNOSIS — I10 ESSENTIAL HYPERTENSION: ICD-10-CM

## 2025-06-26 DIAGNOSIS — F43.10 PTSD (POST-TRAUMATIC STRESS DISORDER): ICD-10-CM

## 2025-06-26 PROCEDURE — 99214 OFFICE O/P EST MOD 30 MIN: CPT | Performed by: FAMILY MEDICINE

## 2025-06-26 PROCEDURE — 90471 IMMUNIZATION ADMIN: CPT

## 2025-06-26 PROCEDURE — 99396 PREV VISIT EST AGE 40-64: CPT | Performed by: FAMILY MEDICINE

## 2025-06-26 PROCEDURE — 90750 HZV VACC RECOMBINANT IM: CPT

## 2025-06-26 NOTE — PATIENT INSTRUCTIONS
"Health maintenance is performed.  Medications are reviewed and stay the same.  Second shingles vaccine given.  Continues to struggle with PTSD.  Agree with weekly counseling.  Psych medications stay the same.  Is not yet ready to return to work.  Review of blood work done in March.  Lipid panel not done but does not need to be repeated.  Recheck in 1 month.   Patient Education     Routine physical for adults   The Basics   Written by the doctors and editors at [facility]   What is a physical? -- A physical is a routine visit, or \"check-up,\" with your doctor. You might also hear it called a \"wellness visit\" or \"preventive visit.\"  During each visit, the doctor will:   Ask about your physical and mental health   Ask about your habits, behaviors, and lifestyle   Do an exam   Give you vaccines if needed   Talk to you about any medicines you take   Give advice about your health   Answer your questions  Getting regular check-ups is an important part of taking care of your health. It can help your doctor find and treat any problems you have. But it's also important for preventing health problems.  A routine physical is different from a \"sick visit.\" A sick visit is when you see a doctor because of a health concern or problem. Since physicals are scheduled ahead of time, you can think about what you want to ask the doctor.  How often should I get a physical? -- It depends on your age and health. In general, for people age 21 years and older:   If you are younger than 50 years, you might be able to get a physical every 3 years.   If you are 50 years or older, your doctor might recommend a physical every year.  If you have an ongoing health condition, like diabetes or high blood pressure, your doctor will probably want to see you more often.  What happens during a physical? -- In general, each visit will include:   Physical exam - The doctor or nurse will check your height, weight, heart rate, and blood pressure. They will also " "look at your eyes and ears. They will ask about how you are feeling and whether you have any symptoms that bother you.   Medicines - It's a good idea to bring a list of all the medicines you take to each doctor visit. Your doctor will talk to you about your medicines and answer any questions. Tell them if you are having any side effects that bother you. You should also tell them if you are having trouble paying for any of your medicines.   Habits and behaviors - This includes:   Your diet   Your exercise habits   Whether you smoke, drink alcohol, or use drugs   Whether you are sexually active   Whether you feel safe at home  Your doctor will talk to you about things you can do to improve your health and lower your risk of health problems. They will also offer help and support. For example, if you want to quit smoking, they can give you advice and might prescribe medicines. If you want to improve your diet or get more physical activity, they can help you with this, too.   Lab tests, if needed - The tests you get will depend on your age and situation. For example, your doctor might want to check your:   Cholesterol   Blood sugar   Iron level   Vaccines - The recommended vaccines will depend on your age, health, and what vaccines you already had. Vaccines are very important because they can prevent certain serious or deadly infections.   Discussion of screening - \"Screening\" means checking for diseases or other health problems before they cause symptoms. Your doctor can recommend screening based on your age, risk, and preferences. This might include tests to check for:   Cancer, such as breast, prostate, cervical, ovarian, colorectal, prostate, lung, or skin cancer   Sexually transmitted infections, such as chlamydia and gonorrhea   Mental health conditions like depression and anxiety  Your doctor will talk to you about the different types of screening tests. They can help you decide which screenings to have. They can " also explain what the results might mean.   Answering questions - The physical is a good time to ask the doctor or nurse questions about your health. If needed, they can refer you to other doctors or specialists, too.  Adults older than 65 years often need other care, too. As you get older, your doctor will talk to you about:   How to prevent falling at home   Hearing or vision tests   Memory testing   How to take your medicines safely   Making sure that you have the help and support you need at home  All topics are updated as new evidence becomes available and our peer review process is complete.  This topic retrieved from [employer] on: May 02, 2024.  Topic 647115 Version 1.0  Release: 32.4.3 - C32.122  © 2024 UpToDate, Inc. and/or its affiliates. All rights reserved.  Consumer Information Use and Disclaimer   Disclaimer: This generalized information is a limited summary of diagnosis, treatment, and/or medication information. It is not meant to be comprehensive and should be used as a tool to help the user understand and/or assess potential diagnostic and treatment options. It does NOT include all information about conditions, treatments, medications, side effects, or risks that may apply to a specific patient. It is not intended to be medical advice or a substitute for the medical advice, diagnosis, or treatment of a health care provider based on the health care provider's examination and assessment of a patient's specific and unique circumstances. Patients must speak with a health care provider for complete information about their health, medical questions, and treatment options, including any risks or benefits regarding use of medications. This information does not endorse any treatments or medications as safe, effective, or approved for treating a specific patient. UpToDate, Inc. and its affiliates disclaim any warranty or liability relating to this information or the use thereof.The use of this information is  governed by the Terms of Use, available at https://www.woltersAndre Phillipeuwer.com/en/know/clinical-effectiveness-terms. 2024© [employer] and its affiliates and/or licensors. All rights reserved.  Copyright   © 2024 [employer] and/or its affiliates. All rights reserved.
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
## 2025-06-27 RX ORDER — TIRZEPATIDE 5 MG/.5ML
5 INJECTION, SOLUTION SUBCUTANEOUS WEEKLY
Qty: 2 ML | Refills: 0 | OUTPATIENT
Start: 2025-06-27 | End: 2025-08-22

## 2025-06-30 ENCOUNTER — OFFICE VISIT (OUTPATIENT)
Dept: BEHAVIORAL/MENTAL HEALTH CLINIC | Facility: CLINIC | Age: 65
End: 2025-06-30
Payer: COMMERCIAL

## 2025-06-30 PROCEDURE — 90834 PSYTX W PT 45 MINUTES: CPT | Performed by: SOCIAL WORKER

## 2025-06-30 NOTE — PSYCH
D-Patient was on time for session and reported that her PCP extended her workman's comp for more weeks.       A- Patient upbeat, cooperative.  We began to be specific about the event.  Discussed desensitize tactics.  She has already went to the  Pharmacy and did well.  A plan will be developed with the help of  staff to assist in getting patient in the hospital with support.       P.  Develop a strategy to assist patient to get back to work.

## 2025-07-03 ENCOUNTER — TELEPHONE (OUTPATIENT)
Age: 65
End: 2025-07-03

## 2025-07-03 NOTE — TELEPHONE ENCOUNTER
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 She will drop off today at [facility]. She thanks us for our help!

## 2025-07-14 ENCOUNTER — OFFICE VISIT (OUTPATIENT)
Dept: BEHAVIORAL/MENTAL HEALTH CLINIC | Facility: CLINIC | Age: 65
End: 2025-07-14
Payer: COMMERCIAL

## 2025-07-14 DIAGNOSIS — F43.10 POST TRAUMATIC STRESS DISORDER (PTSD): Primary | ICD-10-CM

## 2025-07-14 PROCEDURE — 90834 PSYTX W PT 45 MINUTES: CPT | Performed by: SOCIAL WORKER

## 2025-07-14 NOTE — BH TREATMENT PLAN
D- patient was on time fro session.  She is following therapeutic instructions.  She is feeling more comfortable , decreased depression, stress, anxiety.  She does have some issues sleeping.  She not longer screams when sleeping. She is gradually working her way back to work ( see treatment Plan)    A-Patient getting stronger, more confident and less vunerable.  She has been following steps to get her back to work.  She continues to have some remnants of trauma, but will work out in time.  She misses her job in  and needs to get back to work.     P- Treatment plan was completed setting treatment focus ( see treatment plan).  Therapeutic modalities utilized CBT

## 2025-07-14 NOTE — BH TREATMENT PLAN
"Outpatient Behavioral Health Psychotherapy Treatment Plan    Barry THAYER Angy  1960     Date of Initial Psychotherapy Assessment:    Date of Current Treatment Plan: 07/14/25  Treatment Plan Target Date: 10-  Treatment Plan Expiration Date: 10-    Diagnosis:   1. Post traumatic stress disorder (PTSD)            Area(s) of Need: DECREASE SYMPTOMS OF TRAUMA WHICH OCCURRED AT WORK.     Long Term Goal 1 (in the client's own words): \" I WANT TO BE MYSELF AND GET BACK TO WORK\"    Stage of Change: Pre-contemplation  Target Date for completion: 10-   Anticipated therapeutic modalities: CBT,    People identified to complete this goal: BARRY FORDE      Objective 1: (identify the means of measuring success in meeting the objective): PATIENT WILL BE ABLE TO RETURN TO WORK WITHOUT STRESS OR HESITATION.  MEASURED BY PT REPORT      Objective 2: (identify the means of measuring success in meeting the objective): PATIENT WILL BE ABLE TO ENTER HOSPITAL WITHOUT HESITATION AND PERFORM HER WORK PRIOR TO HER ACCIDENT. .       SHORT Term Goal 1 (in the client's own words): \" I WANT TO FEEL LIKE MYSELF AGAIN AND RETURN TO WORK\"    Stage of Change: Pre-contemplation  Target Date for completion: 10-   Anticipated therapeutic modalities: CBT   People identified to complete this goal: SWAPNIL DOZIER      Objective 1: (identify the means of measuring success in meeting the objective): PATIENT WILL ATTEND ALL SCHEDULED THERAPY SESSION.  MEASURED BY ATTENDANCE.      Objective 2: (identify the means of measuring success in meeting the objective): PATIENT WILL ATTEMPT TO CONTACT STAFF AT HOSPITAL WHOM SHE IS FRIENDS WITH AND TRUST.    Objective  3.  PATIENT WILL WORK IN STAGES BECOMING REACQUAINTED WITH HER WORK ENVIRONMENT     1. PATIENT WILL DRIVE TO [employer] [facility] [employer] IN [facility].( PROCESS EMOTIONS IN SESSIONS)     2. PATIENT WILL COORDINATE WITH STAFF AT WORK TO MEET HER IN THE [facility] " ( PROCESS IN SESSIONS)     3. PATIENT WILL HAVE LUNCH IN THE STAFF LOUNGE AT THE HOSPITAL WITH STAFF ( PROCESS IN SESSION)     4. PATIENT WILL BE ON THE UNIT FOR SHORT PERIODS OF TIME 5,-10,-15 ( PROCESS IN SESSION)        I am currently under the care of a [facility] psychiatric provider: yes    My [facility] psychiatric provider is: CAL    I am currently taking psychiatric medications: Yes, as prescribed    I feel that I will be ready for discharge from mental health care when I reach the following (measurable goal/objective): AM ABLE TO RETURN TO WORK AND FEEL COMFORTABLE AND NO FEAR.     For children and adults who have a legal guardian:   Has there been any change to custody orders and/or guardianship status? N/A. If yes, attach updated documentation.    I have created my Crisis Plan and have been offered a copy of this plan    Behavioral Health Treatment Plan [facility]: Diagnosis and Treatment Plan explained to Barry Travis acknowledges an understanding of their diagnosis. Barry Travis agrees to this treatment plan.    I have been offered a copy of this Treatment Plan. yes

## 2025-07-16 DIAGNOSIS — E66.3 OVERWEIGHT WITH BODY MASS INDEX (BMI) OF 29 TO 29.9 IN ADULT: ICD-10-CM

## 2025-07-17 RX ORDER — TIRZEPATIDE 5 MG/.5ML
5 INJECTION, SOLUTION SUBCUTANEOUS WEEKLY
Qty: 2 ML | Refills: 0 | Status: SHIPPED | OUTPATIENT
Start: 2025-07-17 | End: 2025-09-11

## 2025-07-31 ENCOUNTER — OFFICE VISIT (OUTPATIENT)
Dept: BEHAVIORAL/MENTAL HEALTH CLINIC | Facility: CLINIC | Age: 65
End: 2025-07-31
Payer: COMMERCIAL

## 2025-07-31 PROCEDURE — 90834 PSYTX W PT 45 MINUTES: CPT | Performed by: SOCIAL WORKER

## 2025-08-04 ENCOUNTER — OFFICE VISIT (OUTPATIENT)
Dept: FAMILY MEDICINE CLINIC | Facility: CLINIC | Age: 65
End: 2025-08-04
Payer: COMMERCIAL

## 2025-08-04 VITALS
HEIGHT: 65 IN | OXYGEN SATURATION: 99 % | BODY MASS INDEX: 24.69 KG/M2 | SYSTOLIC BLOOD PRESSURE: 114 MMHG | WEIGHT: 148.2 LBS | HEART RATE: 65 BPM | TEMPERATURE: 97.6 F | DIASTOLIC BLOOD PRESSURE: 70 MMHG

## 2025-08-04 DIAGNOSIS — T71.193A ASSAULT BY MANUAL STRANGULATION: ICD-10-CM

## 2025-08-04 DIAGNOSIS — F41.1 ANXIETY STATE: ICD-10-CM

## 2025-08-04 DIAGNOSIS — F43.10 PTSD (POST-TRAUMATIC STRESS DISORDER): Primary | ICD-10-CM

## 2025-08-04 PROBLEM — R22.9 SOFT TISSUE SWELLING: Status: RESOLVED | Noted: 2025-04-11 | Resolved: 2025-08-04

## 2025-08-04 PROCEDURE — 99214 OFFICE O/P EST MOD 30 MIN: CPT | Performed by: FAMILY MEDICINE
